# Patient Record
Sex: MALE | Race: WHITE | Employment: OTHER | URBAN - METROPOLITAN AREA
[De-identification: names, ages, dates, MRNs, and addresses within clinical notes are randomized per-mention and may not be internally consistent; named-entity substitution may affect disease eponyms.]

---

## 2017-02-20 ENCOUNTER — TRANSFERRED RECORDS (OUTPATIENT)
Dept: FAMILY MEDICINE | Facility: CLINIC | Age: 72
End: 2017-02-20

## 2017-02-20 ENCOUNTER — OFFICE VISIT (OUTPATIENT)
Dept: FAMILY MEDICINE | Facility: CLINIC | Age: 72
End: 2017-02-20

## 2017-02-20 VITALS
BODY MASS INDEX: 33.27 KG/M2 | DIASTOLIC BLOOD PRESSURE: 78 MMHG | HEART RATE: 70 BPM | RESPIRATION RATE: 16 BRPM | HEIGHT: 69 IN | WEIGHT: 224.6 LBS | OXYGEN SATURATION: 93 % | SYSTOLIC BLOOD PRESSURE: 112 MMHG

## 2017-02-20 DIAGNOSIS — B02.30 HERPES OCULAR: ICD-10-CM

## 2017-02-20 DIAGNOSIS — M25.552 PAIN OF BOTH HIP JOINTS: ICD-10-CM

## 2017-02-20 DIAGNOSIS — M25.551 PAIN OF BOTH HIP JOINTS: ICD-10-CM

## 2017-02-20 DIAGNOSIS — D69.3 IDIOPATHIC THROMBOCYTOPENIA (H): ICD-10-CM

## 2017-02-20 DIAGNOSIS — K21.9 GASTROESOPHAGEAL REFLUX DISEASE, ESOPHAGITIS PRESENCE NOT SPECIFIED: ICD-10-CM

## 2017-02-20 DIAGNOSIS — I25.10 CORONARY ARTERY DISEASE INVOLVING NATIVE CORONARY ARTERY OF NATIVE HEART WITHOUT ANGINA PECTORIS: ICD-10-CM

## 2017-02-20 DIAGNOSIS — E78.5 HYPERLIPIDEMIA LDL GOAL <70: ICD-10-CM

## 2017-02-20 DIAGNOSIS — Z00.00 MEDICARE ANNUAL WELLNESS VISIT, SUBSEQUENT: Primary | ICD-10-CM

## 2017-02-20 DIAGNOSIS — Z13.6 SCREENING FOR AAA (ABDOMINAL AORTIC ANEURYSM): ICD-10-CM

## 2017-02-20 PROBLEM — I25.84 CORONARY ARTERY DISEASE DUE TO CALCIFIED CORONARY LESION: Status: RESOLVED | Noted: 2017-02-20 | Resolved: 2017-02-20

## 2017-02-20 PROBLEM — I25.84 CORONARY ARTERY DISEASE DUE TO CALCIFIED CORONARY LESION: Status: ACTIVE | Noted: 2017-02-20

## 2017-02-20 LAB
% GRANULOCYTES: 68.1 % (ref 42.2–75.2)
HCT VFR BLD AUTO: 45 % (ref 39–51)
HEMOGLOBIN: 14.9 G/DL (ref 13.4–17.5)
LYMPHOCYTES NFR BLD AUTO: 25 % (ref 20.5–51.1)
MCH RBC QN AUTO: 29.8 PG (ref 27–31)
MCHC RBC AUTO-ENTMCNC: 33.2 G/DL (ref 33–37)
MCV RBC AUTO: 89.5 FL (ref 80–100)
MONOCYTES NFR BLD AUTO: 6.9 % (ref 1.7–9.3)
PLATELET # BLD AUTO: 198 K/UL (ref 140–450)
RBC # BLD AUTO: 5.02 X10/CMM (ref 4.2–5.9)
WBC # BLD AUTO: 6.9 X10/CMM (ref 3.8–11)

## 2017-02-20 PROCEDURE — 99215 OFFICE O/P EST HI 40 MIN: CPT | Mod: 25 | Performed by: FAMILY MEDICINE

## 2017-02-20 PROCEDURE — 80061 LIPID PANEL: CPT | Mod: 90 | Performed by: FAMILY MEDICINE

## 2017-02-20 PROCEDURE — 36415 COLL VENOUS BLD VENIPUNCTURE: CPT | Performed by: FAMILY MEDICINE

## 2017-02-20 PROCEDURE — G0439 PPPS, SUBSEQ VISIT: HCPCS | Performed by: FAMILY MEDICINE

## 2017-02-20 PROCEDURE — 85025 COMPLETE CBC W/AUTO DIFF WBC: CPT | Performed by: FAMILY MEDICINE

## 2017-02-20 PROCEDURE — 80053 COMPREHEN METABOLIC PANEL: CPT | Mod: 90 | Performed by: FAMILY MEDICINE

## 2017-02-20 RX ORDER — DIPHENOXYLATE HYDROCHLORIDE AND ATROPINE SULFATE 2.5; .025 MG/1; MG/1
1 TABLET ORAL DAILY
COMMUNITY

## 2017-02-20 RX ORDER — ATORVASTATIN CALCIUM 80 MG/1
80 TABLET, FILM COATED ORAL DAILY
Qty: 90 TABLET | Refills: 3 | Status: SHIPPED | OUTPATIENT
Start: 2017-02-20 | End: 2017-02-24

## 2017-02-20 NOTE — MR AVS SNAPSHOT
After Visit Summary   2/20/2017    Lui Up    MRN: [id number]           Patient Information     Date Of Birth          1945        Visit Information        Provider Department      2/20/2017 9:30 AM Hernan Ross MD [facility]        Today's Diagnoses     Medicare annual wellness visit, subsequent    -  1    Gastroesophageal reflux disease, esophagitis presence not specified        Hyperlipidemia LDL goal <70        Idiopathic thrombocytopenia (H)        Herpes ocular        Coronary artery disease due to calcified coronary lesion        Coronary artery disease involving native coronary artery of native heart without angina pectoris        Pain of both hip joints        Screening for AAA (abdominal aortic aneurysm)          Care Instructions      Preventive Health Recommendations:       Male Ages 65 and over    Yearly exam:             See your health care provider every year in order to  o   Review health changes.   o   Discuss preventive care.    o   Review your medicines if your doctor has prescribed any.    Talk with your health care provider about whether you should have a test to screen for prostate cancer (PSA).    Every 3 years, have a diabetes test (fasting glucose). If you are at risk for diabetes, you should have this test more often.    Every 5 years, have a cholesterol test. Have this test more often if you are at risk for high cholesterol or heart disease.     Every 10 years, have a colonoscopy. Or, have a yearly FIT test (stool test). These exams will check for colon cancer.    Talk to with your health care provider about screening for Abdominal Aortic Aneurysm if you have a family history of AAA or have a history of smoking.  Shots:     Get a flu shot each year.     Get a tetanus shot every 10 years.     Talk to your doctor about your pneumonia vaccines. There are now two you should receive - Pneumovax (PPSV 23) and Prevnar (PCV 13).    Talk to your doctor  about a shingles vaccine.     Talk to your doctor about the hepatitis B vaccine.  Nutrition:     Eat at least 5 servings of fruits and vegetables each day.     Eat whole-grain bread, whole-wheat pasta and brown rice instead of white grains and rice.     Talk to your doctor about Calcium and Vitamin D.   Lifestyle    Exercise for at least 150 minutes a week (30 minutes a day, 5 days a week). This will help you control your weight and prevent disease.     Limit alcohol to one drink per day.     No smoking.     Wear sunscreen to prevent skin cancer.     See your dentist every six months for an exam and cleaning.     See your eye doctor every 1 to 2 years to screen for conditions such as glaucoma, macular degeneration and cataracts.        Follow-ups after your visit        Additional Services     [facility]       Referral to [facility]  Phone: [phone]  Fax: [phone]  Reason for referral: Needs screening of Abd Aorta with US, Also needs Xray of hips for eval of hip pain                  Who to contact     If you have questions or need follow up information about today's clinic visit or your schedule please contact [facility] directly at [phone].  Normal or non-critical lab and imaging results will be communicated to you by StackAdapthart, letter or phone within 4 business days after the clinic has received the results. If you do not hear from us within 7 days, please contact the clinic through StackAdapthart or phone. If you have a critical or abnormal lab result, we will notify you by phone as soon as possible.  Submit refill requests through Veloci[employer] or call your pharmacy and they will forward the refill request to us. Please allow 3 business days for your refill to be completed.          Additional Information About Your Visit        StackAdapthart Information     [employer] gives you secure access to your electronic health record. If you see a primary care provider, you can also send  "messages to your care team and make appointments. If you have questions, please call your primary care clinic.  If you do not have a primary care provider, please call [phone] and they will assist you.        Care EveryWhere ID     This is your Care EveryWhere ID. This could be used by other organizations to access your [facility] medical records  [id number]        Your Vitals Were     Pulse Respirations Height Pulse Oximetry BMI (Body Mass Index)       70 16 1.753 m (5' 9\") 93% 33.17 kg/m2        Blood Pressure from Last 3 Encounters:   02/20/17 112/78   01/27/16 125/81   12/29/14 130/78    Weight from Last 3 Encounters:   02/20/17 101.9 kg (224 lb 9.6 oz)   01/27/16 100.9 kg (222 lb 6.4 oz)   12/29/14 103.9 kg (229 lb)              We Performed the Following     CBC with Diff/Plt ([facility])     Comp. Metabolic Panel (14) ([facility])     Lipid Panel ([facility])     Referral to [facility]          Where to get your medicines      These medications were sent to [place] [address] [place] [address]  [address] [place] [address]     Phone:  [phone]     atorvastatin 80 MG tablet          Primary Care Provider Office Phone # Fax #    Hernan Ross [phone]       [facility] [address] [facility] [address]        Thank you!     Thank you for choosing [facility]  for your care. Our goal is always to provide you with excellent care. Hearing back from our patients is one way we can continue to improve our services. Please take a few minutes to complete the written survey that you may receive in the mail after your visit with us. Thank you!             Your Updated Medication List - Protect others around you: Learn how to safely use, store and throw away your medicines at www.disposemymeds.org.          This list is accurate as of: 2/20/17  9:53 AM.  Always use your most recent med list.                   Brand Name Dispense Instructions " for use    acyclovir 400 MG tablet    ZOVIRAX    180 tablet    Take 1 tablet (400 mg) by mouth 2 times daily       aspirin 81 MG tablet      Take 1 tablet by mouth every morning.       atorvastatin 80 MG tablet    LIPITOR    90 tablet    Take 1 tablet (80 mg) by mouth daily       MULTI-VITAMINS Tabs      Take by mouth daily       PEPCID COMPLETE PO      Take 1 tablet by mouth as needed.

## 2017-02-20 NOTE — PROGRESS NOTES
SUBJECTIVE:                                                            Lui Up is a 72 year old male who presents for Preventive Visit.    Also Multiple medical issues   See notes  Gerd, pepcid ac, 1-2 per week- not feeling any symptoms of consequence  Cad on cardiac calcium - many years ago . On asa and lipitor.   High lipids , on atorvastatin , NO MYALGIAS  Thrombocytopenia- documented on cbcs in past. No bleeding or bruising issues  Hip pain   right > left pain at night  Has been for > year , last year I told him it was bursitis.  Not limitin much, bothers at night  Tries to stretch on yoga ball     Exercise walks- DAILY AND FEELS GOOD    Are you in the first 12 months of your Medicare Part B coverage?  No    Healthy Habits:    Do you get at least three servings of calcium containing foods daily (dairy, green leafy vegetables, etc.)? no, taking calcium and/or vitamin D supplement: daily    Amount of exercise or daily activities, outside of work: 7 day(s) per week  1-3 mi walking per day    Problems taking medications regularly No    Medication side effects: No    Have you had an eye exam in the past two years? yes    Do you see a dentist twice per year? yes    Do you have sleep apnea, excessive snoring or daytime drowsiness?daytime drowsiness    COGNITIVE SCREEN  1) Repeat 3 items (Banana, Sunrise, Chair)    2) Clock draw: NORMAL  3) 3 item recall: Recalls 3 objects  Results: 3 items recalled: COGNITIVE IMPAIRMENT LESS LIKELY    Mini-CogTM Copyright S Rocco. Licensed by the author for use in [facility]; reprinted with permission (alvaro@.[email or web address]). All rights reserved.        Fasting lab, pain in both hips    All Histories reviewed and updated in EPIC as appropriate.    Lives in [facility], wife is ale      Social History   Substance Use Topics     Smoking status: Former Smoker     Types: Cigarettes     Quit date: 7/26/1971     Smokeless tobacco: Never Used      Comment: quit in [facility]      Alcohol use 1.8 - 3.0 oz/week     3 - 5 Cans of beer per week       The patient does not drink >3 drinks per day nor >7 drinks per week.  Past Medical History   Diagnosis Date     Arthritis of knee      CAD (coronary artery disease)      based on calcium on ct     CKD (chronic kidney disease) stage 2, GFR 60-89 ml/min 8/4/2011     Colonic polyp      Diverticula of colon      GERD (gastroesophageal reflux disease)      Hearing loss on left      Herpes simplex, ocular      history of herpes, prophylaxtic per ophtho     Hyperlipidemia LDL goal <70      Thrombocytopenia (H)      Past Surgical History   Procedure Laterality Date     Laminectomy lumbar one level  1991,1998,2000     3 surgeriies      Arthroscopy knee  1995     left     Herniorrhaphy incisional (location)  10/2010     Hemicolectomy, rt/lt  2/2009     right Laprocopic     Current Outpatient Prescriptions   Medication     Multiple Vitamin (MULTI-VITAMINS) TABS     atorvastatin (LIPITOR) 80 MG tablet     acyclovir (ZOVIRAX) 400 MG tablet     Famotidine-Ca Carb-Mag Hydrox (PEPCID COMPLETE PO)     aspirin 81 MG tablet     [DISCONTINUED] atorvastatin (LIPITOR) 80 MG tablet     No current facility-administered medications for this visit.        Today's PHQ-2 Score:   PHQ-2 ( 1999 Pfizer) 2/20/2017 1/27/2016   Q1: Little interest or pleasure in doing things 0 0   Q2: Feeling down, depressed or hopeless 0 0   PHQ-2 Score 0 0       Do you feel safe in your environment - Yes    Do you have a Health Care Directive?: Yes: Advance Directive has been received and scanned.    Current providers sharing in care for this patient include:   Patient Care Team:  Hernan Ross MD as PCP - General (Family Practice)      Hearing impairment: Yes, Difficulty following a conversation in a noisy restaurant or crowded room.    Ability to successfully perform activities of daily living: Yes, no assistance needed     Fall risk:  Fallen 2 or more times in the past year?: No  Any fall  "with injury in the past year?: No    Home safety:  none identified  2  HEARING FREQUENCY TESTED BOTH EARS:Failed  Right Ear/Left Ear      500 Hz: passed/failed: pass    1000 Hz: Passed   2000 Hz: Passed   4000 Hz: failed left , pass right  Brianne Shah, MA 2/20/2017        The following health maintenance items are reviewed in Epic and correct as of today:  Health Maintenance   Topic Date Due     AORTIC ANEURYSM SCREENING (SYSTEM ASSIGNED)  01/26/2009     INFLUENZA VACCINE (SYSTEM ASSIGNED)  09/01/2016     FALL RISK ASSESSMENT  01/27/2017     TETANUS IMMUNIZATION (SYSTEM ASSIGNED)  09/26/2017     COLONOSCOPY Q5 YR INBASKET MESSAGE  01/12/2020     LIPID SCREEN Q5 YR MALE (SYSTEM ASSIGNED)  01/27/2021     ADVANCE DIRECTIVE PLANNING Q5 YRS (NO INBASKET)  03/21/2021     PNEUMOCOCCAL  Completed         ROS:  Constitutional, HEENT, cardiovascular, pulmonary, GI, , neuro, skin, endocrine and psych systems are negative, except as otherwise noted.    positive   pain in right hip at night has to get usp and move around then can return to sleep  relux is good with h2 blk     Problem list, Medication list, Allergies, and Medical/Social/Surgical histories reviewed in [employer] and updated as appropriate.  OBJECTIVE:                                                            /78 (BP Location: Right arm, Patient Position: Chair, Cuff Size: Adult Large)  Pulse 70  Resp 16  Ht 1.753 m (5' 9\")  Wt 101.9 kg (224 lb 9.6 oz)  SpO2 93%  BMI 33.17 kg/m2   Estimated body mass index is 32.37 kg/(m^2) as calculated from the following:    Height as of 1/27/16: 1.765 m (5' 9.5\").    Weight as of 1/27/16: 100.9 kg (222 lb 6.4 oz).  EXAM:   GENERAL: alert, no distress, over weight and Muscular   EYES: Eyes grossly normal to inspection, PERRL and conjunctivae and sclerae normal  HENT: ear canals and TM's normal, nose and mouth without ulcers or lesions  NECK: no adenopathy, no asymmetry, masses, or scars and thyroid normal to palpation  RESP: " lungs clear to auscultation - no rales, rhonchi or wheezes  CV: regular rate and rhythm, normal S1 S2, no S3 or S4, SOFT 2/6 SYS MURMUR click or rub, no peripheral edema and peripheral pulses strong  ABDOMEN: MID LINE SCAR LOOKS GOOD NO HJERNIA  soft, nontender, no hepatosplenomegaly, no masses and bowel sounds normal  RECTAL: normal sphincter tone, no rectal masses, prostate normal size, smooth, nontender without nodules or masses  MS: BACK MIDLINE SCAR right hip has limited flex and rotation int and ext. Vs the left  Not terribly painful for him  SKIN: no suspicious lesions or rashes  NEURO: Normal strength and tone, mentation intact and speech normal  PSYCH: mentation appears normal, affect normal/bright    ASSESSMENT / PLAN:                                                            Lui was seen today for physical and hearing screening.    Diagnoses and all orders for this visit:    Medicare annual wellness visit, subsequent  Exercise and diet seem good  imm UTD  Colon UTD  NO MORE PSA REC    Screening for AAA (abdominal aortic aneurysm)  -     Referral to [facility]  Past history of smoking, and mild cad    Multiple medical issues    Pain of both hip joints  -     Referral to [facility]  Bilateral xray  seemslike restricted motiion. Not enough of a problem to consider surgery but may benefit from PT if no arthritis.    Coronary artery disease involving native coronary artery of native heart without angina pectoris  -     atorvastatin (LIPITOR) 80 MG tablet; Take 1 tablet (80 mg) by mouth daily  -     Lipid Panel ([facility])  No angia     Hyperlipidemia LDL goal <70  -     Comp. Metabolic Panel (14) ([facility])  -     Lipid Panel ([facility])    Idiopathic thrombocytopenia (H)  -     CBC with Diff/Plt ([facility])  No bruising or bleeding     Gastroesophageal reflux disease, esophagitis presence not specified  Continue otc meds prn    Herpes ocular        End of Life Planning:  Patient currently has an  "advanced directive: Yes.  Practitioner is supportive of decision.    COUNSELING:  Reviewed preventive health counseling, as reflected in patient instructions       Consider AAA screening for ages 65-75 and smoking history       Regular exercise       Healthy diet/nutrition       Vision screening       Colon cancer screening        Estimated body mass index is 32.37 kg/(m^2) as calculated from the following:    Height as of 1/27/16: 1.765 m (5' 9.5\").    Weight as of 1/27/16: 100.9 kg (222 lb 6.4 oz).  Weight management plan: Discussed healthy diet and exercise guidelines and patient will follow up in 12 months in clinic to re-evaluate.   reports that he quit smoking about 45 years ago. His smoking use included Cigarettes. He has never used smokeless tobacco.      Appropriate preventive services were discussed with this patient, including applicable screening as appropriate for cardiovascular disease, diabetes, osteopenia/osteoporosis, and glaucoma.  As appropriate for age/gender, discussed screening for colorectal cancer, prostate cancer, breast cancer, and cervical cancer. Checklist reviewing preventive services available has been given to the patient.    Reviewed patients plan of care and provided an AVS. The Basic Care Plan (routine screening as documented in Health Maintenance) for Lui meets the Care Plan requirement. This Care Plan has been established and reviewed with the Patient.    Counseling Resources:  ATP IV Guidelines  Pooled Cohorts Equation Calculator  Breast Cancer Risk Calculator  FRAX Risk Assessment  ICSI Preventive Guidelines  Dietary Guidelines for Americans, 2010  USDA's MyPlate  ASA Prophylaxis  Lung CA Screening    >40 min spent with patient, greater than 50% spent on discussion/education/planning - as outlined in assessment and plan      Hernan Ross MD  [facility]  "

## 2017-02-20 NOTE — LETTER
[facility] [address] [place]  [address]  Phone: [phone]    February 21, 2017      Lui Up                                                                                                                                 [address] [place] [address]            Dear Lui,      The Xray shows mild degenerative arthritis is hips. Looks the same in each side.   If the pain is a bigger issue let me know.      If you have any questions or concerns, please call the clinic at [phone] and make a follow up appointment with me.      Sincerely,    Hernan Moffett M.D.

## 2017-02-21 LAB
ALBUMIN SERPL-MCNC: 4.2 G/DL (ref 3.5–4.8)
ALBUMIN/GLOB SERPL: 2 {RATIO} (ref 1.1–2.5)
ALP SERPL-CCNC: 89 IU/L (ref 39–117)
ALT SERPL-CCNC: 17 IU/L (ref 0–44)
AST SERPL-CCNC: 18 IU/L (ref 0–40)
BILIRUB SERPL-MCNC: 1.3 MG/DL (ref 0–1.2)
BUN SERPL-MCNC: 14 MG/DL (ref 8–27)
BUN/CREATININE RATIO: 13 (ref 10–22)
CALCIUM SERPL-MCNC: 9.3 MG/DL (ref 8.6–10.2)
CHLORIDE SERPLBLD-SCNC: 101 MMOL/L (ref 96–106)
CHOLEST SERPL-MCNC: 169 MG/DL (ref 100–199)
CREAT SERPL-MCNC: 1.07 MG/DL (ref 0.76–1.27)
EGFR IF AFRICN AM: 79 ML/MIN/1.73
EGFR IF NONAFRICN AM: 68 ML/MIN/1.73
GLOBULIN, TOTAL: 2.1 G/DL (ref 1.5–4.5)
GLUCOSE SERPL-MCNC: 90 MG/DL (ref 65–99)
HDLC SERPL-MCNC: 68 MG/DL
LDL/HDL RATIO: 1.3 RATIO UNITS (ref 0–3.6)
LDLC SERPL CALC-MCNC: 87 MG/DL (ref 0–99)
POTASSIUM SERPL-SCNC: 4.4 MMOL/L (ref 3.5–5.2)
PROT SERPL-MCNC: 6.3 G/DL (ref 6–8.5)
SODIUM SERPL-SCNC: 140 MMOL/L (ref 134–144)
TOTAL CO2: 24 MMOL/L (ref 18–28)
TRIGL SERPL-MCNC: 69 MG/DL (ref 0–149)
VLDLC SERPL CALC-MCNC: 14 MG/DL (ref 5–40)

## 2017-02-21 NOTE — PROGRESS NOTES
Antoine the Xray shows mild degenerative arthritis is hips. Looks the same in each side. If the pain is a bigger issue let me know. Wally Moffett MD

## 2017-05-03 ENCOUNTER — TRANSFERRED RECORDS (OUTPATIENT)
Dept: FAMILY MEDICINE | Facility: CLINIC | Age: 72
End: 2017-05-03

## 2017-05-10 ENCOUNTER — TRANSFERRED RECORDS (OUTPATIENT)
Dept: FAMILY MEDICINE | Facility: CLINIC | Age: 72
End: 2017-05-10

## 2017-05-24 ENCOUNTER — TRANSFERRED RECORDS (OUTPATIENT)
Dept: FAMILY MEDICINE | Facility: CLINIC | Age: 72
End: 2017-05-24

## 2017-06-07 ENCOUNTER — TRANSFERRED RECORDS (OUTPATIENT)
Dept: FAMILY MEDICINE | Facility: CLINIC | Age: 72
End: 2017-06-07

## 2017-06-16 ENCOUNTER — TRANSFERRED RECORDS (OUTPATIENT)
Dept: FAMILY MEDICINE | Facility: CLINIC | Age: 72
End: 2017-06-16

## 2018-01-09 ENCOUNTER — TRANSFERRED RECORDS (OUTPATIENT)
Dept: FAMILY MEDICINE | Facility: CLINIC | Age: 73
End: 2018-01-09

## 2018-01-09 ENCOUNTER — OFFICE VISIT (OUTPATIENT)
Dept: FAMILY MEDICINE | Facility: CLINIC | Age: 73
End: 2018-01-09

## 2018-01-09 VITALS
OXYGEN SATURATION: 95 % | DIASTOLIC BLOOD PRESSURE: 82 MMHG | WEIGHT: 230 LBS | BODY MASS INDEX: 33.97 KG/M2 | HEART RATE: 68 BPM | SYSTOLIC BLOOD PRESSURE: 132 MMHG | TEMPERATURE: 97.8 F

## 2018-01-09 DIAGNOSIS — N50.89 MASS OF RIGHT TESTICLE: Primary | ICD-10-CM

## 2018-01-09 PROCEDURE — 99214 OFFICE O/P EST MOD 30 MIN: CPT | Performed by: FAMILY MEDICINE

## 2018-01-09 NOTE — MR AVS SNAPSHOT
After Visit Summary   1/9/2018    Lui Up    MRN: [id number]           Patient Information     Date Of Birth          1945        Visit Information        Provider Department      1/9/2018 2:30 PM Kayla Cordova MD [facility]        Care Instructions    Schedule Testicular US    Td get that at the pharmacy    Get new shingles vaccine when available          Follow-ups after your visit        Who to contact     If you have questions or need follow up information about today's clinic visit or your schedule please contact [facility] directly at [phone].  Normal or non-critical lab and imaging results will be communicated to you by Stroodlehart, letter or phone within 4 business days after the clinic has received the results. If you do not hear from us within 7 days, please contact the clinic through 6Wunderkindert or phone. If you have a critical or abnormal lab result, we will notify you by phone as soon as possible.  Submit refill requests through [employer] or call your pharmacy and they will forward the refill request to us. Please allow 3 business days for your refill to be completed.          Additional Information About Your Visit        MyChart Information     [employer] gives you secure access to your electronic health record. If you see a primary care provider, you can also send messages to your care team and make appointments. If you have questions, please call your primary care clinic.  If you do not have a primary care provider, please call [phone] and they will assist you.        Care EveryWhere ID     This is your Care EveryWhere ID. This could be used by other organizations to access your [place] medical records  [id number]        Your Vitals Were     Pulse Temperature Pulse Oximetry BMI (Body Mass Index)          68 97.8  F (36.6  C) (Oral) 95% 33.97 kg/m2         Blood Pressure from Last 3 Encounters:   01/09/18 132/82   02/20/17 112/78   01/27/16  125/81    Weight from Last 3 Encounters:   01/09/18 104.3 kg (230 lb)   02/20/17 101.9 kg (224 lb 9.6 oz)   01/27/16 100.9 kg (222 lb 6.4 oz)              Today, you had the following     No orders found for display       Primary Care Provider Office Phone # Fax #    Hernan Ross [phone] [address]  [facility]note 3[facility] [address]        Equal Access to Services     KEAGAN GERARDO : Hadii aad ku hadasho Soomaali, waaxda luqadaha, qaybta kaalmada adeegyada, waxay idiin hayaan adeeg kharash laabi . So [facility] [phone].    ATENCIÓN: Si habla español, tiene a bull disposición servicios gratuitos de asistencia lingüística. Llame al [phone].    We comply with applicable federal civil rights laws and [place] laws. We do not discriminate on the basis of race, color, national origin, age, disability, sex, sexual orientation, or gender identity.            Thank you!     Thank you for choosing [facility]  for your care. Our goal is always to provide you with excellent care. Hearing back from our patients is one way we can continue to improve our services. Please take a few minutes to complete the written survey that you may receive in the mail after your visit with us. Thank you!             Your Updated Medication List - Protect others around you: Learn how to safely use, store and throw away your medicines at www.disposemymeds.org.          This list is accurate as of: 1/9/18  2:50 PM.  Always use your most recent med list.                   Brand Name Dispense Instructions for use Diagnosis    acyclovir 400 MG tablet    ZOVIRAX    180 tablet    Take 1 tablet (400 mg) by mouth 2 times daily    Ocular herpes       aspirin 81 MG tablet      Take 1 tablet by mouth every morning.        atorvastatin 80 MG tablet    LIPITOR    90 tablet    TAKE ONE TABLET BY MOUTH ONE TIME DAILY    Coronary artery disease involving native coronary artery of native heart without angina pectoris        MULTI-VITAMINS Tabs      Take by mouth daily        PEPCID COMPLETE PO      Take 1 tablet by mouth as needed.

## 2018-01-09 NOTE — PROGRESS NOTES
HCM:  AAA US no discussed today  Flu vaccine- 8/22/2017 [facility]  Zoster Vaccine- 8/22/2017 [facility]   Pneumovax 23 vaccine- 8/22/2017 [facility]   Tetanus Vaccine - would like, not sure which one to give.     Cc: Lump on right testicle  Last CPX on 2/20/2017     SUBJECTIVE:   Lui Up is a 72 year old male who presents to clinic today for the following health issues:  No urinary symptoms present.     White male with glasses balding  Here with female    Lump on Right Testicle     Never checked before. Saw something on media about checking.    Feels like harder mass  NO fever  NO hernia history   No STD concerns      Duration: Patient noticed a couple of weeks ago.     Description (location/character/radiation): Right testicle, dull pain - intermittently, no numbness, no known recent trauma. Previous trauma in the left testicle as a child.     Intensity:  Not applicate     Accompanying signs and symptoms: see above, no other symptoms     History (similar episodes/previous evaluation): None    Precipitating or alleviating factors: None    Therapies tried and outcome: None         Sleep 4-5 hours, nocturia x1  Appetite ok  Exercise walking    Smoking no, in college then quit  ETOH beer 2 per day  Street drugs/MJ no  Caffeine yes      Problem list and histories reviewed & adjusted, as indicated.  Additional history: as documented    Patient Active Problem List   Diagnosis     Idiopathic thrombocytopenia (H)     Hyperlipidemia LDL goal <70     GERD (gastroesophageal reflux disease)     Advance Care Planning      Herpes ocular     Coronary artery disease involving native coronary artery of native heart without angina pectoris     Past Surgical History:   Procedure Laterality Date     ARTHROSCOPY KNEE  1995    left     HEMICOLECTOMY, RT/LT  2/2009    right Laprocopic     HERNIORRHAPHY INCISIONAL (LOCATION)  10/2010     LAMINECTOMY LUMBAR ONE LEVEL  1991,1998,2000    3 surgeriies        Social History   Substance  "Use Topics     Smoking status: Former Smoker     Types: Cigarettes     Quit date: 7/26/1971     Smokeless tobacco: Never Used      Comment: quit in [facility]     Alcohol use 3.0 - 4.2 oz/week     5 - 7 Cans of beer per week     Family History   Problem Relation Age of Onset     CANCER Mother      gallbadder     Neurologic Disorder Father      parkinson     Alzheimer Disease Father          Current Outpatient Prescriptions   Medication Sig Dispense Refill     atorvastatin (LIPITOR) 80 MG tablet TAKE ONE TABLET BY MOUTH ONE TIME DAILY 90 tablet 3     Multiple Vitamin (MULTI-VITAMINS) TABS Take by mouth daily       acyclovir (ZOVIRAX) 400 MG tablet Take 1 tablet (400 mg) by mouth 2 times daily 180 tablet 3     Famotidine-Ca Carb-Mag Hydrox (PEPCID COMPLETE PO) Take 1 tablet by mouth as needed.       aspirin 81 MG tablet Take 1 tablet by mouth every morning.       No Known Allergies  Recent Labs   Lab Test  02/20/17   1002  01/27/16   0845  12/22/14   0820   11/22/10   1715  11/20/10   0530   LDL  87  84  93   < >   --    --    HDL  68  67  61   < >   --    --    TRIG  69  50  60   < >   --    --    ALT  17  17  16   < >   --   15   CR  1.07  1.11  1.13   < >  1.11  1.35*   GFRESTIMATED   --    --    --    --   66  53*   GFRESTBLACK   --    --    --    --   80  64   POTASSIUM  4.4  5.1  4.5   < >  4.4  4.0    < > = values in this interval not displayed.      BP Readings from Last 3 Encounters:   01/09/18 132/82   02/20/17 112/78   01/27/16 125/81    Wt Readings from Last 3 Encounters:   01/09/18 104.3 kg (230 lb)   02/20/17 101.9 kg (224 lb 9.6 oz)   01/27/16 100.9 kg (222 lb 6.4 oz)       Estimated body mass index is 33.97 kg/(m^2) as calculated from the following:    Height as of 2/20/17: 1.753 m (5' 9\").    Weight as of this encounter: 104.3 kg (230 lb).             Labs reviewed in EPIC          Reviewed and updated as needed this visit by clinical staff     Reviewed and updated as needed this visit by Provider     "     ROS:  Constitutional, HEENT, cardiovascular, pulmonary, GI, , musculoskeletal, neuro, skin, endocrine and psych systems are negative, except as otherwise noted.  Check skin on face    STD no concerns    Ex-    OBJECTIVE:   /82  Pulse 68  Temp 97.8  F (36.6  C) (Oral)  Wt 104.3 kg (230 lb)  SpO2 95%  BMI 33.97 kg/m2  There is no height or weight on file to calculate BMI.  GENERAL: healthy, alert and no distress glasses and bilateral hearing aids  EYES: Eyes grossly normal to inspection, PERRL and conjunctivae and sclerae normal  HENT: ear canals and TM's normal, nose and mouth without ulcers or lesions  NECK: no adenopathy, no asymmetry, masses, or scars and thyroid normal to palpation  RESP: lungs clear to auscultation - no rales, rhonchi or wheezes  CV: regular rate and rhythm, normal S1 S2, no S3 or S4, no murmur, click or rub, no peripheral edema and peripheral pulses strong  ABDOMEN: soft, nontender, no hepatosplenomegaly, no masses and bowel sounds normal   (male): normal male genitalia without lesions or urethral discharge, no hernia  Likely varicocele. None tender Vesicles, no epididymitis. No maryjane hard mass. S/p vasectomy  MS: no gross musculoskeletal defects noted, no edema  SKIN: no suspicious lesions or rashes  NEURO: Normal strength and tone, mentation intact and speech normal  PSYCH: mentation appears normal, affect normal/bright  LYMPH: no cervical, supraclavicular, axillary, or inguinal adenopathy    Diagnostic Test Results:  Results for orders placed or performed in visit on 02/20/17   Comp. Metabolic Panel (14) ([facility])   Result Value Ref Range    Glucose 90 65 - 99 mg/dL    Urea Nitrogen 14 8 - 27 mg/dL    Creatinine 1.07 0.76 - 1.27 mg/dL    eGFR If NonAfricn Am 68 >59 mL/min/1.73    eGFR If Africn Am 79 >59 mL/min/1.73    BUN/Creatinine Ratio 13 10 - 22    Sodium 140 134 - 144 mmol/L    Potassium 4.4 3.5 - 5.2 mmol/L    Chloride 101 96 - 106 mmol/L    Total CO2  24 18 - 28 mmol/L    Calcium 9.3 8.6 - 10.2 mg/dL    Protein Total 6.3 6.0 - 8.5 g/dL    Albumin 4.2 3.5 - 4.8 g/dL    Globulin, Total 2.1 1.5 - 4.5 g/dL    A/G Ratio 2.0 1.1 - 2.5    Bilirubin Total 1.3 (H) 0.0 - 1.2 mg/dL    Alkaline Phosphatase 89 39 - 117 IU/L    AST 18 0 - 40 IU/L    ALT 17 0 - 44 IU/L    Narrative    Performed at:  01 - [facility] [address], [place], [place]  [id number]  : Johan Mckeon MD, Phone:  [phone]   CBC with Diff/Plt ([facility])   Result Value Ref Range    WBC x10/cmm 6.9 3.8 - 11.0 x10/cmm    % Lymphocytes 25.0 20.5 - 51.1 %    % Monocytes 6.9 1.7 - 9.3 %    % Granulocytes 68.1 42.2 - 75.2 %    RBC x10/cmm 5.02 4.2 - 5.9 x10/cmm    Hemoglobin 14.9 13.4 - 17.5 g/dl    Hematocrit 45.0 39 - 51 %    MCV 89.5 80 - 100 fL    MCH 29.8 27.0 - 31.0 pg    MCHC 33.2 33.0 - 37.0 g/dL    Platelet Count 198 140 - 450 K/uL   Lipid Panel ([facility])   Result Value Ref Range    Cholesterol 169 100 - 199 mg/dL    Triglycerides 69 0 - 149 mg/dL    HDL Cholesterol 68 >39 mg/dL    VLDL Cholesterol Sulaiman 14 5 - 40 mg/dL    LDL Cholesterol Calculated 87 0 - 99 mg/dL    LDL/HDL Ratio 1.3 0.0 - 3.6 ratio units    Narrative    Performed at:  01 - [facility] [address], [place], [place]  [id number]  : Johan Mckeon MD, Phone:  [phone]       ASSESSMENT/PLAN:     ASSESSMENT / PLAN:  (N50.9) Mass of right testicle  (primary encounter diagnosis)  Comment: likely varicocele  Doubt epidydimitis or cancer  Sent for US   Plan: US, observation            Patient Instructions   Schedule Testicular US (likely varicocele)    Td get that at the pharmacy    Get new shingles vaccine when available      Kayla Cordova MD  [facility]

## 2018-01-09 NOTE — PATIENT INSTRUCTIONS
Schedule Testicular US (likely varicocele)    Td get that at the pharmacy    Get new shingles vaccine when available

## 2018-01-11 ENCOUNTER — TELEPHONE (OUTPATIENT)
Dept: FAMILY MEDICINE | Facility: CLINIC | Age: 73
End: 2018-01-11

## 2018-01-12 NOTE — TELEPHONE ENCOUNTER
----- Message from Kayla Cordova MD sent at 1/11/2018  7:06 AM CST -----  Please call with results, which are within the range we expected. The patient may continue his current plan of care.  Testicular US results  RN please review these with the patient. No action needed.     Kayla Cordova MD

## 2018-01-12 NOTE — TELEPHONE ENCOUNTER
Patient called with results of testicular U/S.  Notified of normal results, small hydrocele but no masses.  advised patient to call clinic with questions or problems.  China Verma

## 2018-02-12 DIAGNOSIS — I25.10 CORONARY ARTERY DISEASE INVOLVING NATIVE CORONARY ARTERY OF NATIVE HEART WITHOUT ANGINA PECTORIS: ICD-10-CM

## 2018-02-12 RX ORDER — ATORVASTATIN CALCIUM 80 MG/1
80 TABLET, FILM COATED ORAL DAILY
Qty: 90 TABLET | Refills: 3 | Status: SHIPPED | OUTPATIENT
Start: 2018-02-12 | End: 2019-02-21

## 2018-08-17 NOTE — PATIENT INSTRUCTIONS
Preventive Health Recommendations:       Male Ages 65 and over    Yearly exam:             See your health care provider every year in order to  o   Review health changes.   o   Discuss preventive care.    o   Review your medicines if your doctor has prescribed any.    Talk with your health care provider about whether you should have a test to screen for prostate cancer (PSA).    Every 3 years, have a diabetes test (fasting glucose). If you are at risk for diabetes, you should have this test more often.    Every 5 years, have a cholesterol test. Have this test more often if you are at risk for high cholesterol or heart disease.     Every 10 years, have a colonoscopy. Or, have a yearly FIT test (stool test). These exams will check for colon cancer.    Talk to with your health care provider about screening for Abdominal Aortic Aneurysm if you have a family history of AAA or have a history of smoking.  Shots:     Get a flu shot each year.     Get a tetanus shot every 10 years.     Talk to your doctor about your pneumonia vaccines. There are now two you should receive - Pneumovax (PPSV 23) and Prevnar (PCV 13).    Talk to your pharmacist about a shingles vaccine.     Talk to your doctor about the hepatitis B vaccine.  Nutrition:     Eat at least 5 servings of fruits and vegetables each day.     Eat whole-grain bread, whole-wheat pasta and brown rice instead of white grains and rice.     Get adequate Calcium and Vitamin D.   Lifestyle    Exercise for at least 150 minutes a week (30 minutes a day, 5 days a week). This will help you control your weight and prevent disease.     Limit alcohol to one drink per day.     No smoking.     Wear sunscreen to prevent skin cancer.     See your dentist every six months for an exam and cleaning.     See your eye doctor every 1 to 2 years to screen for conditions such as glaucoma, macular degeneration and cataracts.    TD and shingrix  get this at the pharmacy (call insurance to check  coverage on Shingrix vaccine)    PT for hip    Trial meloxicam with food once daily. Tylenol is ok. Not additional ASA, Ibuprofen or alleve with this.  Heat or ice which ever feels better  Salon Pas lidocaine 4% otc for pain    Continue diet and exercise as you are able    F/u one month for recheck hip    Labs today

## 2018-08-17 NOTE — PROGRESS NOTES
SUBJECTIVE:   Lui Up is a 73 year old male who presents for Preventive Visit.    White male glasses    [employer] in HS  Retired  Formerly [facility], [place], [place], and substituted after that.  Hobbies   Dog  Taking care of parents  Has RV. [place] rib fest. 3 days of diarrhea- better now. Music festival.  Concerns with right hip, pain almost daily, feels like it is pinched.   On and off one year. Today not an issue.  Hard to sit. Feels like pinching.  Walking dog 2 times per day or more. No issues.  Getting up.  Stairs both up and down hurts   Tried yoga ball, stretch.  Ibuprofen prn  Enu pain relief OTC  Just got new walking shoes  Runner in the past    Sleep 5-6 hours, nocturia sometimes  Slower stream.   Appetite ok  Breakfast 2 c coffee roll, coffee and PB toast  Lunch PB sandwich, water or beer.  Supper last night maccoronia hot dish with veggies Wife cooking  Snack protein bar, chips, chocolate  Fruits banana, pear  Veggies peas, mixed veggies  Exercise walking    Fall on water when walking the dog over one month ago buttocks no insury  Seizure no  LOC no    Smoking in college, none recent  ETOH beer one a day or less. Never 2 per day  Street drugs/MJ no  Caffeine yes  Depression no  Anxiety no  Panic no  SI/SP no  Hallucinations no  Paranoid no  Manic no  OCD no  PTSD no  Gambling no  Guns yes locked one is, one is not. Ammo is not . Advised to lock the gun and move the ammo.  Sitting 7-8/10 when hip flared. Several times a day sometimes. 45-60 min at a time  Standing sometimes 4-5/10  Walking helps  Lying 7-8/10  Stairs only if pinched before I get there. Worse if already started.    Are you in the first 12 months of your Medicare Part B coverage?  No    Healthy Habits:    Do you get at least three servings of calcium containing foods daily (dairy, green leafy vegetables, etc.)? yes and no, taking calcium and/or vitamin D supplement: no    Amount of exercise or  daily activities, outside of work: 7 day(s) per week    Problems taking medications regularly No    Medication side effects: No    Have you had an eye exam in the past two years? yes    Do you see a dentist twice per year? yes    Do you have sleep apnea, excessive snoring or daytime drowsiness?no      Ability to successfully perform activities of daily living: Yes, no assistance needed    Home safety:  none identified     Hearing impairment: Yes, wear hearing aid.    Fall risk:  Fallen 2 or more times in the past year?: No  Any fall with injury in the past year?: No        COGNITIVE SCREEN  1) Repeat 3 items (Leader, Season, Table)    2) Clock draw: NORMAL  3) 3 item recall: Recalls 3 objects  Results: 3 items recalled: COGNITIVE IMPAIRMENT LESS LIKELY    Mini-CogTM Copyright S Rocco. Licensed by the author for use in [facility]; reprinted with permission ([email or web address]@[email or web address]). All rights reserved.                Reviewed and updated as needed this visit by clinical staff         Reviewed and updated as needed this visit by Provider        Social History   Substance Use Topics     Smoking status: Former Smoker     Types: Cigarettes     Quit date: 7/26/1971     Smokeless tobacco: Never Used      Comment: quit in [facility]     Alcohol use 3.0 - 4.2 oz/week     5 - 7 Cans of beer per week       If you drink alcohol do you typically have >3 drinks per day or >7 drinks per week? No                        Today's PHQ-2 Score:   PHQ-2 ( 1999 Pfizer) 2/20/2017 1/27/2016   Q1: Little interest or pleasure in doing things 0 0   Q2: Feeling down, depressed or hopeless 0 0   PHQ-2 Score 0 0       Do you feel safe in your environment -yes    Do you have a Health Care Directive?: Yes: Advance Directive has been received and scanned.    Current providers sharing in care for this patient include:   Patient Care Team:  Kayla Cordova MD as PCP - General (Family Practice)    The following health maintenance items are reviewed  "in Epic and correct as of today:  Health Maintenance   Topic Date Due     AORTIC ANEURYSM SCREENING (SYSTEM ASSIGNED)  01/26/2009     TETANUS IMMUNIZATION (SYSTEM ASSIGNED)  09/26/2017     FALL RISK ASSESSMENT  02/20/2018     PHQ-2 Q1 YR  02/20/2018     INFLUENZA VACCINE (1) 09/01/2018     COLONOSCOPY Q5 YR  01/12/2020     ADVANCE DIRECTIVE PLANNING Q5 YRS  03/21/2021     LIPID SCREEN Q5 YR MALE (SYSTEM ASSIGNED)  02/20/2022     PNEUMOCOCCAL  Completed     Labs reviewed in EPIC  BP Readings from Last 3 Encounters:   08/22/18 110/78   01/09/18 132/82   02/20/17 112/78    Wt Readings from Last 3 Encounters:   08/22/18 101.2 kg (223 lb)   01/09/18 104.3 kg (230 lb)   02/20/17 101.9 kg (224 lb 9.6 oz)       Estimated body mass index is 32.93 kg/(m^2) as calculated from the following:    Height as of this encounter: 1.753 m (5' 9\").    Weight as of this encounter: 101.2 kg (223 lb).  Weight loss is recommended           Patient Active Problem List   Diagnosis     Idiopathic thrombocytopenia (H)     Hyperlipidemia LDL goal <70     GERD (gastroesophageal reflux disease)     Advance Care Planning      Herpes ocular     Coronary artery disease involving native coronary artery of native heart without angina pectoris     Past Surgical History:   Procedure Laterality Date     ARTHROSCOPY KNEE  1995    left     HEMICOLECTOMY, RT/LT  2/2009    right Laprocopic     HERNIORRHAPHY INCISIONAL (LOCATION)  10/2010     LAMINECTOMY LUMBAR ONE LEVEL  1991,1998,2000    3 surgeriies        Social History   Substance Use Topics     Smoking status: Former Smoker     Types: Cigarettes     Quit date: 7/26/1971     Smokeless tobacco: Never Used      Comment: quit in colleg     Alcohol use 3.0 - 4.2 oz/week     5 - 7 Cans of beer per week     Family History   Problem Relation Age of Onset     Cancer Mother      gallbadder     Neurologic Disorder Father      parkinson     Alzheimer Disease Father          Current Outpatient Prescriptions " "  Medication Sig Dispense Refill     acyclovir (ZOVIRAX) 400 MG tablet Take 1 tablet (400 mg) by mouth 2 times daily 180 tablet 3     aspirin 81 MG tablet Take 1 tablet by mouth every morning.       atorvastatin (LIPITOR) 80 MG tablet Take 1 tablet (80 mg) by mouth daily 90 tablet 3     Famotidine-Ca Carb-Mag Hydrox (PEPCID COMPLETE PO) Take 1 tablet by mouth as needed.       Multiple Vitamin (MULTI-VITAMINS) TABS Take by mouth daily       No Known Allergies  Recent Labs   Lab Test  02/20/17   1002  01/27/16   0845  12/22/14   0820   11/22/10   1715  11/20/10   0530   LDL  87  84  93   < >   --    --    HDL  68  67  61   < >   --    --    TRIG  69  50  60   < >   --    --    ALT  17  17  16   < >   --   15   CR  1.07  1.11  1.13   < >  1.11  1.35*   GFRESTIMATED   --    --    --    --   66  53*   GFRESTBLACK   --    --    --    --   80  64   POTASSIUM  4.4  5.1  4.5   < >  4.4  4.0    < > = values in this interval not displayed.            ROS:  Constitutional, HEENT, cardiovascular, pulmonary, GI, , musculoskeletal, neuro, skin, endocrine and psych systems are negative, except as otherwise noted.    OBJECTIVE:   /78  Pulse 76  Resp 16  Ht 1.753 m (5' 9\")  Wt 101.2 kg (223 lb)  SpO2 99%  BMI 32.93 kg/m2 Estimated body mass index is 33.97 kg/(m^2) as calculated from the following:    Height as of 2/20/17: 1.753 m (5' 9\").    Weight as of 1/9/18: 104.3 kg (230 lb).  EXAM:   GENERAL: healthy, alert and no distress white male glasses bilateral hearing aids  EYES: Eyes grossly normal to inspection, PERRL and conjunctivae and sclerae normal  HENT: ear canals and TM's normal, nose and mouth without ulcers or lesions  NECK: no adenopathy, no asymmetry, masses, or scars and thyroid normal to palpation  RESP: lungs clear to auscultation - no rales, rhonchi or wheezes  CV: regular rate and rhythm, normal S1 S2, no S3 or S4, no murmur, click or rub, no peripheral edema and peripheral pulses strong  ABDOMEN: soft, " nontender, no hepatosplenomegaly, no masses and bowel sounds normal   (male): normal male genitalia without lesions or urethral discharge, no hernia  RECTAL: normal sphincter tone, no rectal masses, prostate normal size, smooth, nontender without nodules or masses  MS: no gross musculoskeletal defects noted, no edema  SKIN: no suspicious lesions or rashes early seborrheic keratosis on scalp-watch  NEURO: Normal strength and tone, mentation intact and speech normal  PSYCH: mentation appears normal, affect normal/bright  LYMPH: no cervical, supraclavicular, axillary, or inguinal adenopathy    ROM of hips tight  Bursa non tender  Tender right front top of the hip bone.    Consider PT    Discussed vaccined get TD and shingrix at pharmacy  Discussed PSA- deferred    Wt Readings from Last 4 Encounters:   08/22/18 101.2 kg (223 lb)   01/09/18 104.3 kg (230 lb)   02/20/17 101.9 kg (224 lb 9.6 oz)   01/27/16 100.9 kg (222 lb 6.4 oz)   more active    Volunteers for [employer] in [place].           New copy of advance care directive brought in today.      Diagnostic Test Results:  Results for orders placed or performed in visit on 02/20/17   Comp. Metabolic Panel (14) ([facility])   Result Value Ref Range    Glucose 90 65 - 99 mg/dL    Urea Nitrogen 14 8 - 27 mg/dL    Creatinine 1.07 0.76 - 1.27 mg/dL    eGFR If NonAfricn Am 68 >59 mL/min/1.73    eGFR If Africn Am 79 >59 mL/min/1.73    BUN/Creatinine Ratio 13 10 - 22    Sodium 140 134 - 144 mmol/L    Potassium 4.4 3.5 - 5.2 mmol/L    Chloride 101 96 - 106 mmol/L    Total CO2 24 18 - 28 mmol/L    Calcium 9.3 8.6 - 10.2 mg/dL    Protein Total 6.3 6.0 - 8.5 g/dL    Albumin 4.2 3.5 - 4.8 g/dL    Globulin, Total 2.1 1.5 - 4.5 g/dL    A/G Ratio 2.0 1.1 - 2.5    Bilirubin Total 1.3 (H) 0.0 - 1.2 mg/dL    Alkaline Phosphatase 89 39 - 117 IU/L    AST 18 0 - 40 IU/L    ALT 17 0 - 44 IU/L    Narrative    Performed at:  01 - [facility] [address], [place], [place]   [id number]  : Johan Mckeon MD, Phone:  [phone]   CBC with Diff/Plt ([facility])   Result Value Ref Range    WBC x10/cmm 6.9 3.8 - 11.0 x10/cmm    % Lymphocytes 25.0 20.5 - 51.1 %    % Monocytes 6.9 1.7 - 9.3 %    % Granulocytes 68.1 42.2 - 75.2 %    RBC x10/cmm 5.02 4.2 - 5.9 x10/cmm    Hemoglobin 14.9 13.4 - 17.5 g/dl    Hematocrit 45.0 39 - 51 %    MCV 89.5 80 - 100 fL    MCH 29.8 27.0 - 31.0 pg    MCHC 33.2 33.0 - 37.0 g/dL    Platelet Count 198 140 - 450 K/uL   Lipid Panel ([facility])   Result Value Ref Range    Cholesterol 169 100 - 199 mg/dL    Triglycerides 69 0 - 149 mg/dL    HDL Cholesterol 68 >39 mg/dL    VLDL Cholesterol Sulaiman 14 5 - 40 mg/dL    LDL Cholesterol Calculated 87 0 - 99 mg/dL    LDL/HDL Ratio 1.3 0.0 - 3.6 ratio units    Narrative    Performed at:  01 - [facility] [address], [place], [place]  [id number]  : Johan Mckeon MD, Phone:  [phone]       ASSESSMENT / PLAN:       ICD-10-CM    1. Medicare annual wellness visit, subsequent Z00.00    2. Need for tetanus booster Z23    3. BMI 32.0-32.9,adult Z68.32    4. Ocular herpes B02.30    5. Coronary artery disease involving native coronary artery of native heart without angina pectoris I25.10 Basic Metabolic Panel (8) ([facility])   6. Idiopathic thrombocytopenia (H) D69.3 CBC with Diff/Plt ([facility])   7. Hyperlipidemia LDL goal <70 E78.5 Lipid Panel ([facility])     Hepatic Function Panel (7) ([facility])   8. Gastroesophageal reflux disease without esophagitis K21.9 CBC with Diff/Plt ([facility])   9. Encounter for therapeutic drug monitoring Z51.81    10. Hip pain, right M25.551 meloxicam (MOBIC) 7.5 MG tablet   11. Former smoker Z87.891        End of Life Planning:  Patient currently has an advanced directive: No.  I have verified the patient's ablity to prepare an advanced directive/make health care decisions.  Literature was provided to assist patient in preparing an advanced directive.    COUNSELING:  Reviewed preventive health  "counseling, as reflected in patient instructions       Regular exercise       Healthy diet/nutrition       Vision screening       Immunizations Reviewed and discussed         Prostate cancer screening deferred    BP Readings from Last 1 Encounters:   08/22/18 110/78     Estimated body mass index is 32.93 kg/(m^2) as calculated from the following:    Height as of this encounter: 1.753 m (5' 9\").    Weight as of this encounter: 101.2 kg (223 lb).      Weight management plan: Discussed healthy diet and exercise guidelines and patient will follow up in 12 months in clinic to re-evaluate.     reports that he quit smoking about 47 years ago. His smoking use included Cigarettes. He has never used smokeless tobacco.      Appropriate preventive services were discussed with this patient, including applicable screening as appropriate for cardiovascular disease, diabetes, osteopenia/osteoporosis, and glaucoma.  As appropriate for age/gender, discussed screening for colorectal cancer, prostate cancer, breast cancer, and cervical cancer. Checklist reviewing preventive services available has been given to the patient.    Reviewed patients plan of care and provided an AVS. The Basic Care Plan (routine screening as documented in Health Maintenance) for Lui meets the Care Plan requirement. This Care Plan has been established and reviewed with the Patient.    Counseling Resources:  ATP IV Guidelines  Pooled Cohorts Equation Calculator  Breast Cancer Risk Calculator  FRAX Risk Assessment  ICSI Preventive Guidelines  Dietary Guidelines for Americans, 2010  USDA's MyPlate  ASA Prophylaxis  Lung CA Screening    ASSESSMENT / PLAN:  (Z00.00) Medicare annual wellness visit, subsequent  (primary encounter diagnosis)  Comment:   Plan: f/u one year    (Z23) Need for tetanus booster  Comment:   Plan: get at pharmacy    (Z68.32) BMI 32.0-32.9,adult  Comment: weight loss is recommended  Plan: diet and exercise discussed    (B02.30) Ocular " herpes  Comment:   Plan: f/u per eye provider. Refill done.     (I25.10) Coronary artery disease involving native coronary artery of native heart without angina pectoris  Comment: no CP today  Plan: Basic Metabolic Panel (8) ([facility])        Per cardiology    (D69.3) Idiopathic thrombocytopenia (H)  Comment: no bleeding/bruising reported  Plan: CBC with Diff/Plt ([facility])            (E78.5) Hyperlipidemia LDL goal <70  Comment:   LDL Cholesterol Calculated   Date Value Ref Range Status   02/20/2017 87 0 - 99 mg/dL Final       Plan: Lipid Panel ([facility]), Hepatic Function Panel         (7) ([facility])            (K21.9) Gastroesophageal reflux disease without esophagitis  Comment:   Plan: CBC with Diff/Plt ([facility])            (Z51.81) Encounter for therapeutic drug monitoring  Comment:   Plan: Continue cares    (M25.551) Hip pain, right  Comment: schedule PT  Plan: meloxicam (MOBIC) 7.5 MG tablet        Deferred imaging at this time      Preventive Health Recommendations:       Male Ages 65 and over    Yearly exam:             See your health care provider every year in order to  o   Review health changes.   o   Discuss preventive care.    o   Review your medicines if your doctor has prescribed any.    Talk with your health care provider about whether you should have a test to screen for prostate cancer (PSA).    Every 3 years, have a diabetes test (fasting glucose). If you are at risk for diabetes, you should have this test more often.    Every 5 years, have a cholesterol test. Have this test more often if you are at risk for high cholesterol or heart disease.     Every 10 years, have a colonoscopy. Or, have a yearly FIT test (stool test). These exams will check for colon cancer.    Talk to with your health care provider about screening for Abdominal Aortic Aneurysm if you have a family history of AAA or have a history of smoking.  Shots:     Get a flu shot each year.     Get a tetanus shot every 10 years.     Talk to your  doctor about your pneumonia vaccines. There are now two you should receive - Pneumovax (PPSV 23) and Prevnar (PCV 13).    Talk to your pharmacist about a shingles vaccine.     Talk to your doctor about the hepatitis B vaccine.  Nutrition:     Eat at least 5 servings of fruits and vegetables each day.     Eat whole-grain bread, whole-wheat pasta and brown rice instead of white grains and rice.     Get adequate Calcium and Vitamin D.   Lifestyle    Exercise for at least 150 minutes a week (30 minutes a day, 5 days a week). This will help you control your weight and prevent disease.     Limit alcohol to one drink per day.     No smoking.     Wear sunscreen to prevent skin cancer.     See your dentist every six months for an exam and cleaning.     See your eye doctor every 1 to 2 years to screen for conditions such as glaucoma, macular degeneration and cataracts.    TD and shingrix  get this at the pharmacy (call insurance to check coverage on Shingrix vaccine)    PT for hip    Trial meloxicam with food once daily. Tylenol is ok. Not additional ASA, Ibuprofen or alleve with this.  Heat or ice which ever feels better  Salon Pas lidocaine 4% otc for pain    Continue diet and exercise as you are able    F/u one month for recheck hip    Labs today            Kayla Cordova MD  [facility]

## 2018-08-22 ENCOUNTER — OFFICE VISIT (OUTPATIENT)
Dept: FAMILY MEDICINE | Facility: CLINIC | Age: 73
End: 2018-08-22

## 2018-08-22 VITALS
WEIGHT: 223 LBS | SYSTOLIC BLOOD PRESSURE: 110 MMHG | RESPIRATION RATE: 16 BRPM | OXYGEN SATURATION: 99 % | HEART RATE: 76 BPM | HEIGHT: 69 IN | BODY MASS INDEX: 33.03 KG/M2 | DIASTOLIC BLOOD PRESSURE: 78 MMHG

## 2018-08-22 DIAGNOSIS — E78.5 HYPERLIPIDEMIA LDL GOAL <70: ICD-10-CM

## 2018-08-22 DIAGNOSIS — M25.551 HIP PAIN, RIGHT: ICD-10-CM

## 2018-08-22 DIAGNOSIS — Z87.891 FORMER SMOKER: ICD-10-CM

## 2018-08-22 DIAGNOSIS — Z00.00 MEDICARE ANNUAL WELLNESS VISIT, SUBSEQUENT: Primary | ICD-10-CM

## 2018-08-22 DIAGNOSIS — D69.3 IDIOPATHIC THROMBOCYTOPENIA (H): ICD-10-CM

## 2018-08-22 DIAGNOSIS — I25.10 CORONARY ARTERY DISEASE INVOLVING NATIVE CORONARY ARTERY OF NATIVE HEART WITHOUT ANGINA PECTORIS: ICD-10-CM

## 2018-08-22 DIAGNOSIS — Z51.81 ENCOUNTER FOR THERAPEUTIC DRUG MONITORING: ICD-10-CM

## 2018-08-22 DIAGNOSIS — Z23 NEED FOR TETANUS BOOSTER: ICD-10-CM

## 2018-08-22 DIAGNOSIS — B02.30 OCULAR HERPES: ICD-10-CM

## 2018-08-22 DIAGNOSIS — K21.9 GASTROESOPHAGEAL REFLUX DISEASE WITHOUT ESOPHAGITIS: ICD-10-CM

## 2018-08-22 LAB
% GRANULOCYTES: 78.2 % (ref 42.2–75.2)
HCT VFR BLD AUTO: 44 % (ref 39–51)
HEMOGLOBIN: 14.6 G/DL (ref 13.4–17.5)
LYMPHOCYTES NFR BLD AUTO: 16.9 % (ref 20.5–51.1)
MCH RBC QN AUTO: 29.4 PG (ref 27–31)
MCHC RBC AUTO-ENTMCNC: 33.1 G/DL (ref 33–37)
MCV RBC AUTO: 88.7 FL (ref 80–100)
MONOCYTES NFR BLD AUTO: 4.9 % (ref 1.7–9.3)
PLATELET # BLD AUTO: 188 K/UL (ref 140–450)
RBC # BLD AUTO: 4.96 X10/CMM (ref 4.2–5.9)
WBC # BLD AUTO: 8.2 X10/CMM (ref 3.8–11)

## 2018-08-22 PROCEDURE — G0439 PPPS, SUBSEQ VISIT: HCPCS | Performed by: FAMILY MEDICINE

## 2018-08-22 PROCEDURE — 85025 COMPLETE CBC W/AUTO DIFF WBC: CPT | Performed by: FAMILY MEDICINE

## 2018-08-22 PROCEDURE — 36415 COLL VENOUS BLD VENIPUNCTURE: CPT | Performed by: FAMILY MEDICINE

## 2018-08-22 PROCEDURE — 99213 OFFICE O/P EST LOW 20 MIN: CPT | Mod: 25 | Performed by: FAMILY MEDICINE

## 2018-08-22 RX ORDER — MELOXICAM 7.5 MG/1
7.5 TABLET ORAL DAILY
Qty: 30 TABLET | Refills: 1 | Status: SHIPPED | OUTPATIENT
Start: 2018-08-22 | End: 2018-11-19

## 2018-08-22 NOTE — MR AVS SNAPSHOT
After Visit Summary   8/22/2018    Lui Up    MRN: [id number]           Patient Information     Date Of Birth          1945        Visit Information        Provider Department      8/22/2018 7:45 AM Kayla Cordova MD [facility]        Today's Diagnoses     Medicare annual wellness visit, subsequent    -  1    Need for tetanus booster        BMI 32.0-32.9,adult        Ocular herpes        Coronary artery disease involving native coronary artery of native heart without angina pectoris        Idiopathic thrombocytopenia (H)        Hyperlipidemia LDL goal <70        Gastroesophageal reflux disease without esophagitis        Encounter for therapeutic drug monitoring        Hip pain, right          Care Instructions      Preventive Health Recommendations:       Male Ages 65 and over    Yearly exam:             See your health care provider every year in order to  o   Review health changes.   o   Discuss preventive care.    o   Review your medicines if your doctor has prescribed any.    Talk with your health care provider about whether you should have a test to screen for prostate cancer (PSA).    Every 3 years, have a diabetes test (fasting glucose). If you are at risk for diabetes, you should have this test more often.    Every 5 years, have a cholesterol test. Have this test more often if you are at risk for high cholesterol or heart disease.     Every 10 years, have a colonoscopy. Or, have a yearly FIT test (stool test). These exams will check for colon cancer.    Talk to with your health care provider about screening for Abdominal Aortic Aneurysm if you have a family history of AAA or have a history of smoking.  Shots:     Get a flu shot each year.     Get a tetanus shot every 10 years.     Talk to your doctor about your pneumonia vaccines. There are now two you should receive - Pneumovax (PPSV 23) and Prevnar (PCV 13).    Talk to your pharmacist about a shingles vaccine.      Talk to your doctor about the hepatitis B vaccine.  Nutrition:     Eat at least 5 servings of fruits and vegetables each day.     Eat whole-grain bread, whole-wheat pasta and brown rice instead of white grains and rice.     Get adequate Calcium and Vitamin D.   Lifestyle    Exercise for at least 150 minutes a week (30 minutes a day, 5 days a week). This will help you control your weight and prevent disease.     Limit alcohol to one drink per day.     No smoking.     Wear sunscreen to prevent skin cancer.     See your dentist every six months for an exam and cleaning.     See your eye doctor every 1 to 2 years to screen for conditions such as glaucoma, macular degeneration and cataracts.    TD and shingrix  get this at the pharmacy (call insurance to check coverage on Shingrix vaccine)    PT for hip    Trial meloxicam with food once daily. Tylenol is ok. Not additional ASA, Ibuprofen or alleve with this.  Heat or ice which ever feels better  Salon Pas lidocaine 4% otc for pain    Continue diet and exercise as you are able    F/u one month for recheck hip    Labs today                Follow-ups after your visit        Who to contact     If you have questions or need follow up information about today's clinic visit or your schedule please contact [facility] directly at [phone].  Normal or non-critical lab and imaging results will be communicated to you by Microarrayshart, letter or phone within 4 business days after the clinic has received the results. If you do not hear from us within 7 days, please contact the clinic through [employer] or phone. If you have a critical or abnormal lab result, we will notify you by phone as soon as possible.  Submit refill requests through [employer] or call your pharmacy and they will forward the refill request to us. Please allow 3 business days for your refill to be completed.          Additional Information About Your Visit        [employer] Information     [employer] gives you  "secure access to your electronic health record. If you see a primary care provider, you can also send messages to your care team and make appointments. If you have questions, please call your primary care clinic.  If you do not have a primary care provider, please call [phone] and they will assist you.        Care EveryWhere ID     This is your Care EveryWhere ID. This could be used by other organizations to access your [facility] medical records  [id number]        Your Vitals Were     Pulse Respirations Height Pulse Oximetry BMI (Body Mass Index)       76 16 1.753 m (5' 9\") 99% 32.93 kg/m2        Blood Pressure from Last 3 Encounters:   08/22/18 110/78   01/09/18 132/82   02/20/17 112/78    Weight from Last 3 Encounters:   08/22/18 101.2 kg (223 lb)   01/09/18 104.3 kg (230 lb)   02/20/17 101.9 kg (224 lb 9.6 oz)              We Performed the Following     Basic Metabolic Panel (8) ([facility])     CBC with Diff/Plt ([facility])     Hepatic Function Panel (7) ([facility])     Lipid Panel ([facility])          Today's Medication Changes          These changes are accurate as of 8/22/18  8:39 AM.  If you have any questions, ask your nurse or doctor.               Start taking these medicines.        Dose/Directions    meloxicam 7.5 MG tablet   Commonly known as:  MOBIC   Used for:  Hip pain, right   Started by:  Kayla Cordova MD        Dose:  7.5 mg   Take 1 tablet (7.5 mg) by mouth daily   Quantity:  30 tablet   Refills:  1            Where to get your medicines      These medications were sent to [place] [address] [place] [address]  [address] [place], [place] [address] [address]     Phone:  [phone]     meloxicam 7.5 MG tablet                Primary Care Provider Office Phone # Fax #    Kayla Cordova [phone] [address]  [facility] [address]        Equal Access to Services     JULIETH GERARDO AH: Hadii aad ku hadasho Soomaali, waaxda luqadaha, qaybta kaalmada rashardda, ananth " merissa lernerlakshmi derasaan ah. So [facility] [phone].    ATENCIÓN: Si habla esequiel, tiene a bull disposición servicios gratuitos de asistencia lingüística. Raghav al [phone].    We comply with applicable federal civil rights laws and [place] laws. We do not discriminate on the basis of race, color, national origin, age, disability, sex, sexual orientation, or gender identity.            Thank you!     Thank you for choosing [facility]  for your care. Our goal is always to provide you with excellent care. Hearing back from our patients is one way we can continue to improve our services. Please take a few minutes to complete the written survey that you may receive in the mail after your visit with us. Thank you!             Your Updated Medication List - Protect others around you: Learn how to safely use, store and throw away your medicines at www.disposemymeds.org.          This list is accurate as of 8/22/18  8:39 AM.  Always use your most recent med list.                   Brand Name Dispense Instructions for use Diagnosis    acyclovir 400 MG tablet    ZOVIRAX    180 tablet    Take 1 tablet (400 mg) by mouth 2 times daily    Ocular herpes       aspirin 81 MG tablet      Take 1 tablet by mouth every morning.        atorvastatin 80 MG tablet    LIPITOR    90 tablet    Take 1 tablet (80 mg) by mouth daily    Coronary artery disease involving native coronary artery of native heart without angina pectoris       meloxicam 7.5 MG tablet    MOBIC    30 tablet    Take 1 tablet (7.5 mg) by mouth daily    Hip pain, right       MULTI-VITAMINS Tabs      Take by mouth daily        PEPCID COMPLETE PO      Take 1 tablet by mouth as needed.

## 2018-08-23 LAB
ALBUMIN SERPL-MCNC: 4.3 G/DL (ref 3.5–4.8)
ALP SERPL-CCNC: 102 IU/L (ref 39–117)
ALT SERPL-CCNC: 17 IU/L (ref 0–44)
AST SERPL-CCNC: 16 IU/L (ref 0–40)
BILIRUB SERPL-MCNC: 1.1 MG/DL (ref 0–1.2)
BILIRUBIN, DIRECT: 0.28 MG/DL (ref 0–0.4)
BUN SERPL-MCNC: 14 MG/DL (ref 8–27)
BUN/CREATININE RATIO: 12 (ref 10–24)
CALCIUM SERPL-MCNC: 9.7 MG/DL (ref 8.6–10.2)
CHLORIDE SERPLBLD-SCNC: 104 MMOL/L (ref 96–106)
CHOLEST SERPL-MCNC: 160 MG/DL (ref 100–199)
CREAT SERPL-MCNC: 1.21 MG/DL (ref 0.76–1.27)
EGFR IF AFRICN AM: 68 ML/MIN/1.73
EGFR IF NONAFRICN AM: 59 ML/MIN/1.73
GLUCOSE SERPL-MCNC: 105 MG/DL (ref 65–99)
HDLC SERPL-MCNC: 59 MG/DL
LDL/HDL RATIO: 1.5 RATIO (ref 0–3.6)
LDLC SERPL CALC-MCNC: 88 MG/DL (ref 0–99)
POTASSIUM SERPL-SCNC: 4.9 MMOL/L (ref 3.5–5.2)
PROT SERPL-MCNC: 6.3 G/DL (ref 6–8.5)
SODIUM SERPL-SCNC: 144 MMOL/L (ref 134–144)
TOTAL CO2: 26 MMOL/L (ref 20–29)
TRIGL SERPL-MCNC: 66 MG/DL (ref 0–149)
VLDLC SERPL CALC-MCNC: 13 MG/DL (ref 5–40)

## 2018-08-24 NOTE — PROGRESS NOTES
Slight elevation in sugar and decrease in GFR. Creatinine was fine. (kidney test)  Lipids are good.  Liver tests were fine  CBC %lymphocytes slightly down, otherwise fine.   No new action needed at this time.

## 2018-08-28 ENCOUNTER — TRANSFERRED RECORDS (OUTPATIENT)
Dept: FAMILY MEDICINE | Facility: CLINIC | Age: 73
End: 2018-08-28

## 2018-09-13 ENCOUNTER — HOSPITAL REPORTS SCAN (OUTPATIENT)
Dept: FAMILY MEDICINE | Facility: CLINIC | Age: 73
End: 2018-09-13
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
## 2018-09-21 ENCOUNTER — OFFICE VISIT (OUTPATIENT)
Dept: FAMILY MEDICINE | Facility: CLINIC | Age: 73
End: 2018-09-21

## 2018-09-21 VITALS
DIASTOLIC BLOOD PRESSURE: 68 MMHG | WEIGHT: 228 LBS | BODY MASS INDEX: 33.67 KG/M2 | OXYGEN SATURATION: 92 % | RESPIRATION RATE: 16 BRPM | HEART RATE: 65 BPM | SYSTOLIC BLOOD PRESSURE: 118 MMHG

## 2018-09-21 DIAGNOSIS — M25.551 HIP PAIN, RIGHT: Primary | ICD-10-CM

## 2018-09-21 PROCEDURE — 99213 OFFICE O/P EST LOW 20 MIN: CPT | Performed by: FAMILY MEDICINE

## 2018-09-21 NOTE — MR AVS SNAPSHOT
After Visit Summary   9/21/2018    Lui Up    MRN: [id number]           Patient Information     Date Of Birth          1945        Visit Information        Provider Department      9/21/2018 4:00 PM Kayla Cordova MD [facility]        Today's Diagnoses     Hip pain, right    -  1      Care Instructions    Referral to [facility] for 2nd opinion and possible right bursa injection          Follow-ups after your visit        Who to contact     If you have questions or need follow up information about today's clinic visit or your schedule please contact [facility] directly at [phone].  Normal or non-critical lab and imaging results will be communicated to you by Quotefishhart, letter or phone within 4 business days after the clinic has received the results. If you do not hear from us within 7 days, please contact the clinic through [employer] or phone. If you have a critical or abnormal lab result, we will notify you by phone as soon as possible.  Submit refill requests through Wami or call your pharmacy and they will forward the refill request to us. Please allow 3 business days for your refill to be completed.          Additional Information About Your Visit        MyChart Information     W[employer] gives you secure access to your electronic health record. If you see a primary care provider, you can also send messages to your care team and make appointments. If you have questions, please call your primary care clinic.  If you do not have a primary care provider, please call [phone] and they will assist you.        Care EveryWhere ID     This is your Care EveryWhere ID. This could be used by other organizations to access your [facility] medical records  [id number]        Your Vitals Were     Pulse Respirations Pulse Oximetry BMI (Body Mass Index)          65 16 92% 33.67 kg/m2         Blood Pressure from Last 3 Encounters:   09/21/18 118/68   08/22/18 110/78   01/09/18  132/82    Weight from Last 3 Encounters:   09/21/18 103.4 kg (228 lb)   08/22/18 101.2 kg (223 lb)   01/09/18 104.3 kg (230 lb)              Today, you had the following     No orders found for display       Primary Care Provider Office Phone # Fax #    Kayla KRISTIN MD [facility] [phone] [address]  [facility] [address]        Equal Access to Services     KEAGAN GERARDO : Hadii aad ku hadasho Soomaali, waaxda luqadaha, qaybta kaalmada adeegyada, waxay idiin hayaan adeeg kharash la'aan . So [facility] [phone].    ATENCIÓN: Si habla español, tiene a bull disposición servicios gratuitos de asistencia lingüística. [facility] [phone].    We comply with applicable federal civil rights laws and [place] laws. We do not discriminate on the basis of race, color, national origin, age, disability, sex, sexual orientation, or gender identity.            Thank you!     Thank you for choosing [facility]  for your care. Our goal is always to provide you with excellent care. Hearing back from our patients is one way we can continue to improve our services. Please take a few minutes to complete the written survey that you may receive in the mail after your visit with us. Thank you!             Your Updated Medication List - Protect others around you: Learn how to safely use, store and throw away your medicines at www.disposemymeds.org.          This list is accurate as of 9/21/18  6:02 PM.  Always use your most recent med list.                   Brand Name Dispense Instructions for use Diagnosis    acyclovir 400 MG tablet    ZOVIRAX    180 tablet    Take 1 tablet (400 mg) by mouth 2 times daily    Ocular herpes       aspirin 81 MG tablet      Take 1 tablet by mouth every morning.        atorvastatin 80 MG tablet    LIPITOR    90 tablet    Take 1 tablet (80 mg) by mouth daily    Coronary artery disease involving native coronary artery of native heart without angina pectoris       meloxicam 7.5 MG  tablet    MOBIC    30 tablet    Take 1 tablet (7.5 mg) by mouth daily    Hip pain, right       MULTI-VITAMINS Tabs      Take by mouth daily        PEPCID COMPLETE PO      Take 1 tablet by mouth as needed.

## 2018-11-16 NOTE — PATIENT INSTRUCTIONS
Before Your Surgery      Call your surgeon if there is any change in your health. This includes signs of a cold or flu (such as a sore throat, runny nose, cough, rash or fever).    Do not smoke, drink alcohol or take over the counter medicine (unless your surgeon or primary care doctor tells you to) for the 24 hours before and after surgery.    If you take prescribed drugs: Follow your doctor s orders about which medicines to take and which to stop until after surgery.    Eating and drinking prior to surgery: follow the instructions from your surgeon    Take a shower or bath the night before surgery. Use the soap your surgeon gave you to gently clean your skin. If you do not have soap from your surgeon, use your regular soap. Do not shave or scrub the surgery site.  Wear clean pajamas and have clean sheets on your bed.     ASA, Multivitamins and Pepcid HOLD  HOLD LIPITOR DAY OF SURGERY resume regular time when eating  Acyclovir Day of surgery with small water day of surgery, then resume regular times when eating    Lab today  EKG

## 2018-11-16 NOTE — PROGRESS NOTES
[facility]  [address] [place] [id number] [phone]  Dept: [phone]    PRE-OP EVALUATION:  Today's date: 2018    Lui Up (: 1945) presents for pre-operative evaluation assessment as requested by Dr. Reeves.  He requires evaluation and anesthesia risk assessment prior to undergoing surgery/procedure for treatment of Back .    Proposed Surgery/ Procedure: Back T1-2 L1 Hemilaminectomoy Microdiscectomy  Date of Surgery/ Procedure: 2018  Time of Surgery/ Procedure: [facility]  Hospital/Surgical Facility: [facility]  Fax number for surgical facility: [phone]  Primary Physician: Kayla Cordova  Type of Anesthesia Anticipated: to be determined    Patient has a Health Care Directive or Living Will:  YES     1. NO - Do you have a history of heart attack, stroke, stent, bypass or surgery on an artery in the head, neck, heart or legs?  2. NO - Do you ever have any pain or discomfort in your chest?  3. NO - Do you have a history of  Heart Failure?  4. NO - Are you troubled by shortness of breath when: walking on the level, up a slight hill or at night?  5. NO - Do you currently have a cold, bronchitis or other respiratory infection?  6. NO - Do you have a cough, shortness of breath or wheezing?  7. NO - Do you sometimes get pains in the calves of your legs when you walk?  8. NO - Do you or anyone in your family have previous history of blood clots?  9. NO - Do you or does anyone in your family have a serious bleeding problem such as prolonged bleeding following surgeries or cuts?  10. NO - Have you ever had problems with anemia or been told to take iron pills?  11. NO - Have you had any abnormal blood loss such as black, tarry or bloody stools, or abnormal vaginal bleeding?  12. NO - Have you ever had a blood transfusion?  13. NO - Have you or any of your relatives ever had problems with anesthesia?  14. NO - Do you have sleep apnea, excessive  snoring or daytime drowsiness?  15. NO - Do you have any prosthetic heart valves?  16. NO - Do you have prosthetic joints?  17. NO - Is there any chance that you may be pregnant?    Sleep in bed a lot more Lying help back, Right side best for pain but worse for GERD. Body pillow, block between legs. Carrying rug and reaching to change light increased pain. 1/10 now.  Appetite too good  Exercise limited walking feels good    Smoking former  ETOH last supper last night one beer, 2 per day max  Street drugs/MJ no  Caffeine yes    Depression no  Anxiety no  Panic no  SI/SP no  Hallucinations no  Paranoid no  Manic no  OCD no  PTSD no  Gambling no  Guns 2 yes, not locked, ammo not separate Lock combination box when kids around or traveling    Cardiology 2007 normal stress echo    Bilateral hearing aids  Glasses  Crowns    Double bone graft upper jaw        OTC asa 81, pepcid complete, tylenol  Latex allergy no    Travel no  Exposure no     ROS: 10 point ROS neg other than the symptoms noted above in the HPI.  /74  Pulse 68  Resp 16  Wt 103.4 kg (228 lb)  SpO2 97%  BMI 33.67 kg/m2  Exam:  Constitutional: healthy, alert and no distress  Head: Normocephalic. No masses, lesions, tenderness or abnormalities  ENT: ENT exam normal, no neck nodes or sinus tenderness  Cardiovascular: negative, PMI normal. No lifts, heaves, or thrills. RRR. No murmurs, clicks gallops or rub  Respiratory: negative, Percussion normal. Good diaphragmatic excursion. Lungs clear  Gastrointestinal: Abdomen soft, non-tender. BS normal. No masses, organomegaly  : Deferred  Musculoskeletal: extremities normal- no gross deformities noted, gait normal and normal muscle tone  Skin: no suspicious lesions or rashes  Neurologic: Gait normal. Reflexes normal and symmetric. Sensation grossly WNL.  Psychiatric: mentation appears normal and affect normal/bright  Hematologic/Lymphatic/Immunologic: Normal cervical lymph nodes      EKG NSR Inferior infarct  age undetermined      HPI:     HPI related to upcoming procedure: T12-L1 right hemilaminectomy microdiscectomy      See problem list for active medical problems.  Problems all longstanding and stable, except as noted/documented.  See ROS for pertinent symptoms related to these conditions.                                                                                                                                                          .    MEDICAL HISTORY:     Patient Active Problem List    Diagnosis Date Noted     Coronary artery disease involving native coronary artery of native heart without angina pectoris 02/20/2017     Priority: Medium     Advance Care Planning  01/27/2016     Priority: Medium     Advance Care Planning 3/21/2016: Receipt of ACP document:  Received: Health Care Directive which was witnessed or notarized on 2-15-16.  Document previously scanned on 3-15-16.  Validation form completed and sent to be scanned.  Code Status needs to be updated to reflect choices in most recent ACP document..  Confirmed/documented designated decision maker(s).  Added by Hillary Love RN, System Director ACP-Honoring Choices   Advance Care Planning 1/27/2016: Patient states they have received and completed their Advance Care directive and the document has already been scanned into their chart on today January 27, 2016         Herpes ocular 01/27/2016     Priority: Medium     GERD (gastroesophageal reflux disease)      Priority: Medium     Hyperlipidemia LDL goal <70      Priority: Medium     Idiopathic thrombocytopenia (H) 08/04/2011     Priority: Medium      Past Medical History:   Diagnosis Date     Arthritis of knee      CAD (coronary artery disease)     based on calcium on ct     CKD (chronic kidney disease) stage 2, GFR 60-89 ml/min 8/4/2011     Colonic polyp      Diverticula of colon      GERD (gastroesophageal reflux disease)      Hearing loss on left      Herpes simplex, ocular     history of herpes,  prophylaxtic per ophtho     Hyperlipidemia LDL goal <70      Thrombocytopenia (H)      Past Surgical History:   Procedure Laterality Date     ARTHROSCOPY KNEE  1995    left     HEMICOLECTOMY, RT/LT  2/2009    right Laprocopic     HERNIORRHAPHY INCISIONAL (LOCATION)  10/2010     LAMINECTOMY LUMBAR ONE LEVEL  1991,1998,2000    3 surgeriies      Current Outpatient Prescriptions   Medication Sig Dispense Refill     acyclovir (ZOVIRAX) 400 MG tablet Take 1 tablet (400 mg) by mouth 2 times daily 180 tablet 3     aspirin 81 MG tablet Take 1 tablet by mouth every morning.       atorvastatin (LIPITOR) 80 MG tablet Take 1 tablet (80 mg) by mouth daily 90 tablet 3     Famotidine-Ca Carb-Mag Hydrox (PEPCID COMPLETE PO) Take 1 tablet by mouth as needed.       meloxicam (MOBIC) 7.5 MG tablet Take 1 tablet (7.5 mg) by mouth daily 30 tablet 1     Multiple Vitamin (MULTI-VITAMINS) TABS Take by mouth daily       OTC products: asa 81, pepcid complete, tylenol    No Known Allergies   Latex Allergy: NO    Social History   Substance Use Topics     Smoking status: Former Smoker     Types: Cigarettes     Quit date: 7/26/1971     Smokeless tobacco: Never Used      Comment: quit in [facility]     Alcohol use 3.0 - 4.2 oz/week     5 - 7 Cans of beer per week     History   Drug Use No         DIAGNOSTICS:   EKG: appears normal, NSR, inferior infarct of questionable age (computer read)    Recent Labs   Lab Test  08/22/18   1024  08/22/18   0946  02/20/17   1023  02/20/17   1002   HGB   --   14.6  14.9   --    PLT   --   188  198   --    NA  144   --    --   140   POTASSIUM  4.9   --    --   4.4   CR  1.21   --    --   1.07      Results for orders placed or performed in visit on 11/19/18   Basic Metabolic Panel (8) ([facility])   Result Value Ref Range    Glucose 121 (H) 65 - 99 mg/dL    Urea Nitrogen 11 8 - 27 mg/dL    Creatinine 1.06 0.76 - 1.27 mg/dL    eGFR If NonAfricn Am 69 >59 mL/min/1.73    eGFR If Africn Am 80 >59 mL/min/1.73    BUN/Creatinine  Ratio 10 10 - 24    Sodium 144 134 - 144 mmol/L    Potassium 4.4 3.5 - 5.2 mmol/L    Chloride 103 96 - 106 mmol/L    Total CO2 29 20 - 29 mmol/L    Calcium 9.6 8.6 - 10.2 mg/dL    Narrative    Performed at:  01 - [facility] [address], [place], [place]  [id number]  : Johnny Amos MD, Phone:  [phone]   Lipid Panel ([facility])   Result Value Ref Range    Cholesterol 159 100 - 199 mg/dL    Triglycerides 81 0 - 149 mg/dL    HDL Cholesterol 61 >39 mg/dL    VLDL Cholesterol Sulaiman 16 5 - 40 mg/dL    LDL Cholesterol Calculated 82 0 - 99 mg/dL    LDL/HDL Ratio 1.3 0.0 - 3.6 ratio    Narrative    Performed at:  01 - [facility] [address], [place], [place]  [id number]  : Johnny Amos MD, Phone:  [phone]   CBC with Diff/Plt ([facility])   Result Value Ref Range    WBC x10/cmm 7.1 3.8 - 11.0 x10/cmm    % Lymphocytes 24.3 20.5 - 51.1 %    % Monocytes 7.2 1.7 - 9.3 %    % Granulocytes 68.5 42.2 - 75.2 %    RBC x10/cmm 4.90 4.2 - 5.9 x10/cmm    Hemoglobin 14.4 13.4 - 17.5 g/dl    Hematocrit 43.9 39 - 51 %    MCV 89.6 80 - 100 fL    MCH 29.4 27.0 - 31.0 pg    MCHC 32.8 (A) 33.0 - 37.0 g/dL    Platelet Count 197 140 - 450 K/uL   PSA Serum ([facility])   Result Value Ref Range    PSA NG/ML 2.8 0.0 - 4.0 ng/mL    Narrative    Performed at:  01 - [facility] [address], [place], [place]  [id number]  : Johnny Amos MD, Phone:  [phone]   Vitamin D  25-Hydroxy ([facility])   Result Value Ref Range    Vitamin D,25-Hydroxy 34.1 30.0 - 100.0 ng/mL    Narrative    Performed at:  01 - [facility] [address], [place], [place]  [id number]  : Johnny Amos MD, Phone:  [phone]       IMPRESSION:   Reason for surgery/procedure: Back T1-2 L1 Hemilaminectomoy Microdiscectomy  Diagnosis/reason for consult: preoperative clearance    The proposed surgical procedure is considered INTERMEDIATE risk.    REVISED CARDIAC RISK INDEX  The patient has the following serious cardiovascular  risks for perioperative complications such as (MI, PE, VFib and 3  AV Block):  No serious cardiac risks  INTERPRETATION: 1 risks: Class II (low risk - 0.9% complication rate)    The patient has the following additional risks for perioperative complications:  No identified additional risks      ICD-10-CM    1. Preop general physical exam Z01.818 EKG 12-lead complete w/read - Clinics   2. Former smoker, stopped smoking in distant past Z87.891    3. Idiopathic thrombocytopenia (H) D69.3 CBC with Diff/Plt ([facility])   4. Hyperlipidemia LDL goal <70 E78.5 Lipid Panel ([facility])   5. Gastroesophageal reflux disease without esophagitis K21.9    6. Herpes ocular B02.30    7. Coronary artery disease involving native coronary artery of native heart without angina pectoris I25.10 EKG 12-lead complete w/read - Clinics     Basic Metabolic Panel (8) ([facility])   8. Screening for prostate cancer Z12.5 PSA Serum ([facility])   9. Vitamin D deficiency E55.9 Vitamin D  25-Hydroxy ([facility])   10. Chronic right-sided thoracic back pain M54.6     G89.29        RECOMMENDATIONS:     --Consult hospital rounder / IM to assist post-op medical management    Cardiovascular Risk  Monitor  LDL Cholesterol Calculated   Date Value Ref Range Status   11/19/2018 82 0 - 99 mg/dL Final       Before Your Surgery      Call your surgeon if there is any change in your health. This includes signs of a cold or flu (such as a sore throat, runny nose, cough, rash or fever).    Do not smoke, drink alcohol or take over the counter medicine (unless your surgeon or primary care doctor tells you to) for the 24 hours before and after surgery.    If you take prescribed drugs: Follow your doctor s orders about which medicines to take and which to stop until after surgery.    Eating and drinking prior to surgery: follow the instructions from your surgeon    Take a shower or bath the night before surgery. Use the soap your surgeon gave you to gently clean your skin. If you do  not have soap from your surgeon, use your regular soap. Do not shave or scrub the surgery site.  Wear clean pajamas and have clean sheets on your bed.     ASA, Multivitamins and Pepcid HOLD  HOLD LIPITOR DAY OF SURGERY resume regular time when eating  Acyclovir Day of surgery with small water day of surgery, then resume regular times when eating    Lab today  EKG                    --Patient is to take all scheduled medications on the day of surgery EXCEPT for modifications listed below.    APPROVAL GIVEN to proceed with proposed procedure, without further diagnostic evaluation       Signed Electronically by: Kayla Cordova MD    Copy of this evaluation report is provided to requesting physician.    [facility] Preop Guidelines    Revised Cardiac Risk Index

## 2018-11-19 ENCOUNTER — OFFICE VISIT (OUTPATIENT)
Dept: FAMILY MEDICINE | Facility: CLINIC | Age: 73
End: 2018-11-19

## 2018-11-19 VITALS
RESPIRATION RATE: 16 BRPM | OXYGEN SATURATION: 97 % | SYSTOLIC BLOOD PRESSURE: 118 MMHG | DIASTOLIC BLOOD PRESSURE: 74 MMHG | WEIGHT: 228 LBS | BODY MASS INDEX: 33.67 KG/M2 | HEART RATE: 68 BPM

## 2018-11-19 DIAGNOSIS — I25.10 CORONARY ARTERY DISEASE INVOLVING NATIVE CORONARY ARTERY OF NATIVE HEART WITHOUT ANGINA PECTORIS: ICD-10-CM

## 2018-11-19 DIAGNOSIS — G89.29 CHRONIC RIGHT-SIDED THORACIC BACK PAIN: ICD-10-CM

## 2018-11-19 DIAGNOSIS — M54.6 CHRONIC RIGHT-SIDED THORACIC BACK PAIN: ICD-10-CM

## 2018-11-19 DIAGNOSIS — E55.9 VITAMIN D DEFICIENCY: ICD-10-CM

## 2018-11-19 DIAGNOSIS — E78.5 HYPERLIPIDEMIA LDL GOAL <70: ICD-10-CM

## 2018-11-19 DIAGNOSIS — Z87.891 FORMER SMOKER, STOPPED SMOKING IN DISTANT PAST: ICD-10-CM

## 2018-11-19 DIAGNOSIS — Z01.818 PREOP GENERAL PHYSICAL EXAM: Primary | ICD-10-CM

## 2018-11-19 DIAGNOSIS — Z12.5 SCREENING FOR PROSTATE CANCER: ICD-10-CM

## 2018-11-19 DIAGNOSIS — D69.3 IDIOPATHIC THROMBOCYTOPENIA (H): ICD-10-CM

## 2018-11-19 DIAGNOSIS — B02.30 HERPES OCULAR: ICD-10-CM

## 2018-11-19 DIAGNOSIS — K21.9 GASTROESOPHAGEAL REFLUX DISEASE WITHOUT ESOPHAGITIS: ICD-10-CM

## 2018-11-19 LAB
% GRANULOCYTES: 68.5 % (ref 42.2–75.2)
HCT VFR BLD AUTO: 43.9 % (ref 39–51)
HEMOGLOBIN: 14.4 G/DL (ref 13.4–17.5)
LYMPHOCYTES NFR BLD AUTO: 24.3 % (ref 20.5–51.1)
MCH RBC QN AUTO: 29.4 PG (ref 27–31)
MCHC RBC AUTO-ENTMCNC: 32.8 G/DL (ref 33–37)
MCV RBC AUTO: 89.6 FL (ref 80–100)
MONOCYTES NFR BLD AUTO: 7.2 % (ref 1.7–9.3)
PLATELET # BLD AUTO: 197 K/UL (ref 140–450)
RBC # BLD AUTO: 4.9 X10/CMM (ref 4.2–5.9)
WBC # BLD AUTO: 7.1 X10/CMM (ref 3.8–11)

## 2018-11-19 PROCEDURE — 99215 OFFICE O/P EST HI 40 MIN: CPT | Performed by: FAMILY MEDICINE

## 2018-11-19 PROCEDURE — 93000 ELECTROCARDIOGRAM COMPLETE: CPT | Performed by: FAMILY MEDICINE

## 2018-11-19 PROCEDURE — 36415 COLL VENOUS BLD VENIPUNCTURE: CPT | Performed by: FAMILY MEDICINE

## 2018-11-19 PROCEDURE — 85025 COMPLETE CBC W/AUTO DIFF WBC: CPT | Performed by: FAMILY MEDICINE

## 2018-11-19 NOTE — MR AVS SNAPSHOT
After Visit Summary   11/19/2018    Lui Up    MRN: [id number]           Patient Information     Date Of Birth          1945        Visit Information        Provider Department      11/19/2018 1:30 PM Kayla Cordova MD [facility]        Today's Diagnoses     Preop general physical exam    -  1    Former smoker, stopped smoking in distant past        Idiopathic thrombocytopenia (H)        Hyperlipidemia LDL goal <70        Gastroesophageal reflux disease without esophagitis        Herpes ocular        Coronary artery disease involving native coronary artery of native heart without angina pectoris        Screening for prostate cancer        Vitamin D deficiency        Chronic right-sided thoracic back pain          Care Instructions      Before Your Surgery      Call your surgeon if there is any change in your health. This includes signs of a cold or flu (such as a sore throat, runny nose, cough, rash or fever).    Do not smoke, drink alcohol or take over the counter medicine (unless your surgeon or primary care doctor tells you to) for the 24 hours before and after surgery.    If you take prescribed drugs: Follow your doctor s orders about which medicines to take and which to stop until after surgery.    Eating and drinking prior to surgery: follow the instructions from your surgeon    Take a shower or bath the night before surgery. Use the soap your surgeon gave you to gently clean your skin. If you do not have soap from your surgeon, use your regular soap. Do not shave or scrub the surgery site.  Wear clean pajamas and have clean sheets on your bed.     ASA, Multivitamins and Pepcid HOLD  HOLD LIPITOR DAY OF SURGERY resume regular time when eating  Acyclovir Day of surgery with small water day of surgery, then resume regular times when eating    Lab today  EKG                      Follow-ups after your visit        Who to contact     If you have questions or need follow  up information about today's clinic visit or your schedule please contact [facility] directly at [phone].  Normal or non-critical lab and imaging results will be communicated to you by [employer]on[employer], letter or phone within 4 business days after the clinic has received the results. If you do not hear from us within 7 days, please contact the clinic through [employer] or phone. If you have a critical or abnormal lab result, we will notify you by phone as soon as possible.  Submit refill requests through Panna or call your pharmacy and they will forward the refill request to us. Please allow 3 business days for your refill to be completed.          Additional Information About Your Visit        [employer] Information     Panna gives you secure access to your electronic health record. If you see a primary care provider, you can also send messages to your care team and make appointments. If you have questions, please call your primary care clinic.  If you do not have a primary care provider, please call [phone] and they will assist you.        Care EveryWhere ID     This is your Care EveryWhere ID. This could be used by other organizations to access your [place] medical records  [id number]        Your Vitals Were     Pulse Respirations Pulse Oximetry BMI (Body Mass Index)          68 16 97% 33.67 kg/m2         Blood Pressure from Last 3 Encounters:   11/19/18 118/74   09/21/18 118/68   08/22/18 110/78    Weight from Last 3 Encounters:   11/19/18 103.4 kg (228 lb)   09/21/18 103.4 kg (228 lb)   08/22/18 101.2 kg (223 lb)              We Performed the Following     Basic Metabolic Panel (8) ([facility])     CBC with Diff/Plt ([facility])     EKG 12-lead complete w/read - Clinics     Lipid Panel ([facility])     PSA Serum ([facility])     Vitamin D  25-Hydroxy ([facility])        Primary Care Provider Office Phone # Fax #    Kayla Cordova [phone] [address]  [facility] [address]         Equal Access to Services     [facility] Center[facility]ARAH : Hadii aad ku hadalma rosaeliseo Desireeraghu, warosinada luqadaha, qaybta kaamieananth stubbs. So [facility] [phone].    ATENCIÓN: Si habla español, tiene a bull disposición servicios gratuitos de asistencia lingüística. Llame al [phone].    We comply with applicable federal civil rights laws and [place] laws. We do not discriminate on the basis of race, color, national origin, age, disability, sex, sexual orientation, or gender identity.            Thank you!     Thank you for choosing [facility]  for your care. Our goal is always to provide you with excellent care. Hearing back from our patients is one way we can continue to improve our services. Please take a few minutes to complete the written survey that you may receive in the mail after your visit with us. Thank you!             Your Updated Medication List - Protect others around you: Learn how to safely use, store and throw away your medicines at www.disposemymeds.org.          This list is accurate as of 11/19/18  1:38 PM.  Always use your most recent med list.                   Brand Name Dispense Instructions for use Diagnosis    acyclovir 400 MG tablet    ZOVIRAX    180 tablet    Take 1 tablet (400 mg) by mouth 2 times daily    Ocular herpes       aspirin 81 MG tablet      Take 1 tablet by mouth every morning.        atorvastatin 80 MG tablet    LIPITOR    90 tablet    Take 1 tablet (80 mg) by mouth daily    Coronary artery disease involving native coronary artery of native heart without angina pectoris       MULTI-VITAMINS Tabs      Take by mouth daily        PEPCID COMPLETE PO      Take 1 tablet by mouth as needed.

## 2018-11-20 LAB
BUN SERPL-MCNC: 11 MG/DL (ref 8–27)
BUN/CREATININE RATIO: 10 (ref 10–24)
CALCIUM SERPL-MCNC: 9.6 MG/DL (ref 8.6–10.2)
CHLORIDE SERPLBLD-SCNC: 103 MMOL/L (ref 96–106)
CHOLEST SERPL-MCNC: 159 MG/DL (ref 100–199)
CREAT SERPL-MCNC: 1.06 MG/DL (ref 0.76–1.27)
EGFR IF AFRICN AM: 80 ML/MIN/1.73
EGFR IF NONAFRICN AM: 69 ML/MIN/1.73
GLUCOSE SERPL-MCNC: 121 MG/DL (ref 65–99)
HDLC SERPL-MCNC: 61 MG/DL
LDL/HDL RATIO: 1.3 RATIO (ref 0–3.6)
LDLC SERPL CALC-MCNC: 82 MG/DL (ref 0–99)
POTASSIUM SERPL-SCNC: 4.4 MMOL/L (ref 3.5–5.2)
PSA NG/ML: 2.8 NG/ML (ref 0–4)
SODIUM SERPL-SCNC: 144 MMOL/L (ref 134–144)
TOTAL CO2: 29 MMOL/L (ref 20–29)
TRIGL SERPL-MCNC: 81 MG/DL (ref 0–149)
VITAMIN D, 25-HYDROXY: 34.1 NG/ML (ref 30–100)
VLDLC SERPL CALC-MCNC: 16 MG/DL (ref 5–40)

## 2018-11-21 NOTE — PROGRESS NOTES
Mild increased sugar otherwise BMP was fine  Lipids were good  PSA increased some but still in normal range.  Vitamin D was normal

## 2018-12-01 ENCOUNTER — HOSPITAL REPORTS SCAN (OUTPATIENT)
Dept: FAMILY MEDICINE | Facility: CLINIC | Age: 73
End: 2018-12-01

## 2018-12-12 NOTE — PROGRESS NOTES
11/26/18 faxed this preop and EKG results with lab results from 11/19/18 to DEANN @ [phone]    Juliano

## 2019-02-21 DIAGNOSIS — I25.10 CORONARY ARTERY DISEASE INVOLVING NATIVE CORONARY ARTERY OF NATIVE HEART WITHOUT ANGINA PECTORIS: ICD-10-CM

## 2019-02-22 RX ORDER — ATORVASTATIN CALCIUM 80 MG/1
TABLET, FILM COATED ORAL
Qty: 90 TABLET | Refills: 3 | Status: SHIPPED | OUTPATIENT
Start: 2019-02-22 | End: 2020-02-09

## 2019-10-02 ENCOUNTER — HEALTH MAINTENANCE LETTER (OUTPATIENT)
Age: 74
End: 2019-10-02

## 2019-12-15 ENCOUNTER — HEALTH MAINTENANCE LETTER (OUTPATIENT)
Age: 74
End: 2019-12-15

## 2021-01-15 ENCOUNTER — HEALTH MAINTENANCE LETTER (OUTPATIENT)
Age: 76
End: 2021-01-15

## 2021-02-02 DIAGNOSIS — Z11.59 ENCOUNTER FOR SCREENING FOR OTHER VIRAL DISEASES: Primary | ICD-10-CM

## 2021-02-14 DIAGNOSIS — Z11.59 ENCOUNTER FOR SCREENING FOR OTHER VIRAL DISEASES: ICD-10-CM

## 2021-02-14 LAB
SARS-COV-2 RNA RESP QL NAA+PROBE: NORMAL
SPECIMEN SOURCE: NORMAL

## 2021-02-14 PROCEDURE — U0003 INFECTIOUS AGENT DETECTION BY NUCLEIC ACID (DNA OR RNA); SEVERE ACUTE RESPIRATORY SYNDROME CORONAVIRUS 2 (SARS-COV-2) (CORONAVIRUS DISEASE [COVID-19]), AMPLIFIED PROBE TECHNIQUE, MAKING USE OF HIGH THROUGHPUT TECHNOLOGIES AS DESCRIBED BY CMS-2020-01-R: HCPCS | Performed by: ORTHOPAEDIC SURGERY

## 2021-02-14 PROCEDURE — U0005 INFEC AGEN DETEC AMPLI PROBE: HCPCS | Performed by: ORTHOPAEDIC SURGERY

## 2021-02-15 LAB
LABORATORY COMMENT REPORT: NORMAL
SARS-COV-2 RNA RESP QL NAA+PROBE: NEGATIVE
SPECIMEN SOURCE: NORMAL

## 2021-02-16 RX ORDER — POLYETHYLENE GLYCOL 400 2.5 MG/ML
SOLUTION/ DROPS OPHTHALMIC DAILY
COMMUNITY

## 2021-02-16 RX ORDER — ATORVASTATIN CALCIUM 80 MG/1
80 TABLET, FILM COATED ORAL DAILY
COMMUNITY

## 2021-02-17 ENCOUNTER — ANESTHESIA (OUTPATIENT)
Dept: SURGERY | Facility: CLINIC | Age: 76
End: 2021-02-17
Payer: MEDICARE

## 2021-02-17 ENCOUNTER — HOSPITAL ENCOUNTER (OUTPATIENT)
Facility: CLINIC | Age: 76
Discharge: HOME | End: 2021-02-17
Attending: ORTHOPAEDIC SURGERY | Admitting: ORTHOPAEDIC SURGERY
Payer: MEDICARE

## 2021-02-17 ENCOUNTER — ANESTHESIA EVENT (OUTPATIENT)
Dept: SURGERY | Facility: CLINIC | Age: 76
End: 2021-02-17
Payer: MEDICARE

## 2021-02-17 VITALS
OXYGEN SATURATION: 90 % | RESPIRATION RATE: 10 BRPM | HEIGHT: 70 IN | WEIGHT: 203 LBS | HEART RATE: 72 BPM | TEMPERATURE: 96.5 F | SYSTOLIC BLOOD PRESSURE: 133 MMHG | BODY MASS INDEX: 29.06 KG/M2 | DIASTOLIC BLOOD PRESSURE: 73 MMHG

## 2021-02-17 DIAGNOSIS — M75.102 ROTATOR CUFF TEAR, NON-TRAUMATIC, LEFT: Primary | ICD-10-CM

## 2021-02-17 LAB — GLUCOSE BLDC GLUCOMTR-MCNC: 122 MG/DL (ref 70–99)

## 2021-02-17 PROCEDURE — C1713 ANCHOR/SCREW BN/BN,TIS/BN: HCPCS | Performed by: ORTHOPAEDIC SURGERY

## 2021-02-17 PROCEDURE — 250N000009 HC RX 250: Performed by: ORTHOPAEDIC SURGERY

## 2021-02-17 PROCEDURE — 250N000011 HC RX IP 250 OP 636: Performed by: ORTHOPAEDIC SURGERY

## 2021-02-17 PROCEDURE — 710N000012 HC RECOVERY PHASE 2, PER MINUTE: Performed by: ORTHOPAEDIC SURGERY

## 2021-02-17 PROCEDURE — 250N000009 HC RX 250: Performed by: ANESTHESIOLOGY

## 2021-02-17 PROCEDURE — 250N000013 HC RX MED GY IP 250 OP 250 PS 637: Performed by: ANESTHESIOLOGY

## 2021-02-17 PROCEDURE — 258N000003 HC RX IP 258 OP 636: Performed by: NURSE ANESTHETIST, CERTIFIED REGISTERED

## 2021-02-17 PROCEDURE — 272N000001 HC OR GENERAL SUPPLY STERILE: Performed by: ORTHOPAEDIC SURGERY

## 2021-02-17 PROCEDURE — 250N000009 HC RX 250: Performed by: NURSE ANESTHETIST, CERTIFIED REGISTERED

## 2021-02-17 PROCEDURE — 370N000017 HC ANESTHESIA TECHNICAL FEE, PER MIN: Performed by: ORTHOPAEDIC SURGERY

## 2021-02-17 PROCEDURE — 272N000002 HC OR SUPPLY OTHER OPNP: Performed by: ORTHOPAEDIC SURGERY

## 2021-02-17 PROCEDURE — 250N000011 HC RX IP 250 OP 636: Performed by: ANESTHESIOLOGY

## 2021-02-17 PROCEDURE — 999N001017 HC STATISTIC GLUCOSE BY METER IP

## 2021-02-17 PROCEDURE — 250N000011 HC RX IP 250 OP 636: Performed by: NURSE ANESTHETIST, CERTIFIED REGISTERED

## 2021-02-17 PROCEDURE — 250N000025 HC SEVOFLURANE, PER MIN: Performed by: ORTHOPAEDIC SURGERY

## 2021-02-17 PROCEDURE — 710N000009 HC RECOVERY PHASE 1, LEVEL 1, PER MIN: Performed by: ORTHOPAEDIC SURGERY

## 2021-02-17 PROCEDURE — 999N000141 HC STATISTIC PRE-PROCEDURE NURSING ASSESSMENT: Performed by: ORTHOPAEDIC SURGERY

## 2021-02-17 PROCEDURE — 360N000076 HC SURGERY LEVEL 3, PER MIN: Performed by: ORTHOPAEDIC SURGERY

## 2021-02-17 PROCEDURE — 82947 ASSAY GLUCOSE BLOOD QUANT: CPT | Performed by: ANESTHESIOLOGY

## 2021-02-17 PROCEDURE — 250N000011 HC RX IP 250 OP 636: Performed by: PHYSICIAN ASSISTANT

## 2021-02-17 DEVICE — IMP ANCHOR TWINFIX S&N ULTRA 4.5MM W/2 #2 SU 72203103: Type: IMPLANTABLE DEVICE | Site: SHOULDER | Status: FUNCTIONAL

## 2021-02-17 DEVICE — IMP ANCHOR FOOTPRINT S&N ULTRA PK 4.5MM 72202901: Type: IMPLANTABLE DEVICE | Site: SHOULDER | Status: FUNCTIONAL

## 2021-02-17 RX ORDER — OXYCODONE HYDROCHLORIDE 5 MG/1
5-10 TABLET ORAL
Qty: 40 TABLET | Refills: 0 | Status: SHIPPED | OUTPATIENT
Start: 2021-02-17

## 2021-02-17 RX ORDER — DEXAMETHASONE SODIUM PHOSPHATE 4 MG/ML
INJECTION, SOLUTION INTRA-ARTICULAR; INTRALESIONAL; INTRAMUSCULAR; INTRAVENOUS; SOFT TISSUE PRN
Status: DISCONTINUED | OUTPATIENT
Start: 2021-02-17 | End: 2021-02-17

## 2021-02-17 RX ORDER — CEFAZOLIN SODIUM 2 G/100ML
2 INJECTION, SOLUTION INTRAVENOUS
Status: COMPLETED | OUTPATIENT
Start: 2021-02-17 | End: 2021-02-17

## 2021-02-17 RX ORDER — FENTANYL CITRATE 50 UG/ML
50 INJECTION, SOLUTION INTRAMUSCULAR; INTRAVENOUS ONCE
Status: DISCONTINUED | OUTPATIENT
Start: 2021-02-17 | End: 2021-02-17 | Stop reason: HOSPADM

## 2021-02-17 RX ORDER — ACETAMINOPHEN 325 MG/1
975 TABLET ORAL ONCE
Status: COMPLETED | OUTPATIENT
Start: 2021-02-17 | End: 2021-02-17

## 2021-02-17 RX ORDER — NALOXONE HYDROCHLORIDE 0.4 MG/ML
0.4 INJECTION, SOLUTION INTRAMUSCULAR; INTRAVENOUS; SUBCUTANEOUS
Status: DISCONTINUED | OUTPATIENT
Start: 2021-02-17 | End: 2021-02-17 | Stop reason: HOSPADM

## 2021-02-17 RX ORDER — FENTANYL CITRATE 50 UG/ML
INJECTION, SOLUTION INTRAMUSCULAR; INTRAVENOUS PRN
Status: DISCONTINUED | OUTPATIENT
Start: 2021-02-17 | End: 2021-02-17

## 2021-02-17 RX ORDER — CEFAZOLIN SODIUM 1 G/3ML
1 INJECTION, POWDER, FOR SOLUTION INTRAMUSCULAR; INTRAVENOUS SEE ADMIN INSTRUCTIONS
Status: DISCONTINUED | OUTPATIENT
Start: 2021-02-17 | End: 2021-02-17 | Stop reason: HOSPADM

## 2021-02-17 RX ORDER — SENNOSIDES A AND B 8.6 MG/1
1 TABLET, FILM COATED ORAL 2 TIMES DAILY PRN
Qty: 25 TABLET | Refills: 0 | Status: SHIPPED | OUTPATIENT
Start: 2021-02-17

## 2021-02-17 RX ORDER — CEFAZOLIN SODIUM 1 G/3ML
1 INJECTION, POWDER, FOR SOLUTION INTRAMUSCULAR; INTRAVENOUS ONCE
Status: COMPLETED | OUTPATIENT
Start: 2021-02-17 | End: 2021-02-17

## 2021-02-17 RX ORDER — PROPOFOL 10 MG/ML
INJECTION, EMULSION INTRAVENOUS PRN
Status: DISCONTINUED | OUTPATIENT
Start: 2021-02-17 | End: 2021-02-17

## 2021-02-17 RX ORDER — GLYCOPYRROLATE 0.2 MG/ML
INJECTION, SOLUTION INTRAMUSCULAR; INTRAVENOUS PRN
Status: DISCONTINUED | OUTPATIENT
Start: 2021-02-17 | End: 2021-02-17

## 2021-02-17 RX ORDER — SODIUM CHLORIDE, SODIUM LACTATE, POTASSIUM CHLORIDE, CALCIUM CHLORIDE 600; 310; 30; 20 MG/100ML; MG/100ML; MG/100ML; MG/100ML
INJECTION, SOLUTION INTRAVENOUS CONTINUOUS PRN
Status: DISCONTINUED | OUTPATIENT
Start: 2021-02-17 | End: 2021-02-17

## 2021-02-17 RX ORDER — FENTANYL CITRATE 50 UG/ML
25-50 INJECTION, SOLUTION INTRAMUSCULAR; INTRAVENOUS
Status: DISCONTINUED | OUTPATIENT
Start: 2021-02-17 | End: 2021-02-17 | Stop reason: HOSPADM

## 2021-02-17 RX ORDER — HYDROMORPHONE HYDROCHLORIDE 1 MG/ML
.3-.5 INJECTION, SOLUTION INTRAMUSCULAR; INTRAVENOUS; SUBCUTANEOUS EVERY 10 MIN PRN
Status: DISCONTINUED | OUTPATIENT
Start: 2021-02-17 | End: 2021-02-17 | Stop reason: HOSPADM

## 2021-02-17 RX ORDER — NALOXONE HYDROCHLORIDE 0.4 MG/ML
0.2 INJECTION, SOLUTION INTRAMUSCULAR; INTRAVENOUS; SUBCUTANEOUS
Status: DISCONTINUED | OUTPATIENT
Start: 2021-02-17 | End: 2021-02-17 | Stop reason: HOSPADM

## 2021-02-17 RX ORDER — ONDANSETRON 2 MG/ML
4 INJECTION INTRAMUSCULAR; INTRAVENOUS EVERY 30 MIN PRN
Status: DISCONTINUED | OUTPATIENT
Start: 2021-02-17 | End: 2021-02-17 | Stop reason: HOSPADM

## 2021-02-17 RX ORDER — LIDOCAINE HYDROCHLORIDE 20 MG/ML
INJECTION, SOLUTION INFILTRATION; PERINEURAL PRN
Status: DISCONTINUED | OUTPATIENT
Start: 2021-02-17 | End: 2021-02-17

## 2021-02-17 RX ORDER — CEFAZOLIN SODIUM 1 G
1 VIAL (EA) INJECTION ONCE
Qty: 1 G | Refills: 0 | Status: SHIPPED | OUTPATIENT
Start: 2021-02-17 | End: 2021-02-17

## 2021-02-17 RX ORDER — ONDANSETRON 2 MG/ML
INJECTION INTRAMUSCULAR; INTRAVENOUS PRN
Status: DISCONTINUED | OUTPATIENT
Start: 2021-02-17 | End: 2021-02-17

## 2021-02-17 RX ORDER — EPHEDRINE SULFATE 50 MG/ML
INJECTION, SOLUTION INTRAMUSCULAR; INTRAVENOUS; SUBCUTANEOUS PRN
Status: DISCONTINUED | OUTPATIENT
Start: 2021-02-17 | End: 2021-02-17

## 2021-02-17 RX ORDER — SODIUM CHLORIDE, SODIUM LACTATE, POTASSIUM CHLORIDE, CALCIUM CHLORIDE 600; 310; 30; 20 MG/100ML; MG/100ML; MG/100ML; MG/100ML
INJECTION, SOLUTION INTRAVENOUS CONTINUOUS
Status: DISCONTINUED | OUTPATIENT
Start: 2021-02-17 | End: 2021-02-17 | Stop reason: HOSPADM

## 2021-02-17 RX ORDER — ONDANSETRON 4 MG/1
4 TABLET, ORALLY DISINTEGRATING ORAL EVERY 30 MIN PRN
Status: DISCONTINUED | OUTPATIENT
Start: 2021-02-17 | End: 2021-02-17 | Stop reason: HOSPADM

## 2021-02-17 RX ORDER — HYDROXYZINE HYDROCHLORIDE 25 MG/1
25-50 TABLET, FILM COATED ORAL EVERY 6 HOURS PRN
Qty: 40 TABLET | Refills: 0 | Status: SHIPPED | OUTPATIENT
Start: 2021-02-17

## 2021-02-17 RX ORDER — CEPHALEXIN 500 MG/1
500 CAPSULE ORAL 4 TIMES DAILY
Qty: 12 CAPSULE | Refills: 0 | Status: SHIPPED | OUTPATIENT
Start: 2021-02-17 | End: 2021-02-20

## 2021-02-17 RX ADMIN — ACETAMINOPHEN 975 MG: 325 TABLET, FILM COATED ORAL at 17:38

## 2021-02-17 RX ADMIN — DEXMEDETOMIDINE HYDROCHLORIDE 8 MCG: 100 INJECTION, SOLUTION INTRAVENOUS at 14:30

## 2021-02-17 RX ADMIN — PROPOFOL 140 MG: 10 INJECTION, EMULSION INTRAVENOUS at 13:22

## 2021-02-17 RX ADMIN — CEFAZOLIN SODIUM 2 G: 2 INJECTION, SOLUTION INTRAVENOUS at 13:37

## 2021-02-17 RX ADMIN — CEFAZOLIN 1 G: 1 INJECTION, POWDER, FOR SOLUTION INTRAMUSCULAR; INTRAVENOUS at 16:33

## 2021-02-17 RX ADMIN — SODIUM CHLORIDE, POTASSIUM CHLORIDE, SODIUM LACTATE AND CALCIUM CHLORIDE: 600; 310; 30; 20 INJECTION, SOLUTION INTRAVENOUS at 11:23

## 2021-02-17 RX ADMIN — CEFAZOLIN 1 G: 1 INJECTION, POWDER, FOR SOLUTION INTRAMUSCULAR; INTRAVENOUS at 15:36

## 2021-02-17 RX ADMIN — PHENYLEPHRINE HYDROCHLORIDE 150 MCG: 10 INJECTION INTRAVENOUS at 13:34

## 2021-02-17 RX ADMIN — PROPOFOL 30 MG: 10 INJECTION, EMULSION INTRAVENOUS at 14:27

## 2021-02-17 RX ADMIN — Medication 5 MG: at 14:52

## 2021-02-17 RX ADMIN — FENTANYL CITRATE 50 MCG: 50 INJECTION, SOLUTION INTRAMUSCULAR; INTRAVENOUS at 13:22

## 2021-02-17 RX ADMIN — ONDANSETRON 4 MG: 2 INJECTION INTRAMUSCULAR; INTRAVENOUS at 15:55

## 2021-02-17 RX ADMIN — PHENYLEPHRINE HYDROCHLORIDE 100 MCG: 10 INJECTION INTRAVENOUS at 14:48

## 2021-02-17 RX ADMIN — PROPOFOL 60 MG: 10 INJECTION, EMULSION INTRAVENOUS at 13:24

## 2021-02-17 RX ADMIN — DEXMEDETOMIDINE HYDROCHLORIDE 4 MCG: 100 INJECTION, SOLUTION INTRAVENOUS at 15:01

## 2021-02-17 RX ADMIN — DEXAMETHASONE SODIUM PHOSPHATE 4 MG: 4 INJECTION, SOLUTION INTRA-ARTICULAR; INTRALESIONAL; INTRAMUSCULAR; INTRAVENOUS; SOFT TISSUE at 13:48

## 2021-02-17 RX ADMIN — PHENYLEPHRINE HYDROCHLORIDE 0.4 MCG/KG/MIN: 10 INJECTION INTRAVENOUS at 13:34

## 2021-02-17 RX ADMIN — FENTANYL CITRATE 50 MCG: 0.05 INJECTION, SOLUTION INTRAMUSCULAR; INTRAVENOUS at 16:59

## 2021-02-17 RX ADMIN — GLYCOPYRROLATE 0.2 MG: 0.2 INJECTION, SOLUTION INTRAMUSCULAR; INTRAVENOUS at 13:48

## 2021-02-17 RX ADMIN — PROPOFOL 20 MG: 10 INJECTION, EMULSION INTRAVENOUS at 15:03

## 2021-02-17 RX ADMIN — FENTANYL CITRATE 50 MCG: 50 INJECTION, SOLUTION INTRAMUSCULAR; INTRAVENOUS at 15:38

## 2021-02-17 RX ADMIN — MIDAZOLAM HYDROCHLORIDE 2 MG: 1 INJECTION, SOLUTION INTRAMUSCULAR; INTRAVENOUS at 12:47

## 2021-02-17 RX ADMIN — BUPIVACAINE HYDROCHLORIDE 20 ML: 5 INJECTION, SOLUTION EPIDURAL; INTRACAUDAL; PERINEURAL at 12:42

## 2021-02-17 RX ADMIN — PHENYLEPHRINE HYDROCHLORIDE 150 MCG: 10 INJECTION INTRAVENOUS at 13:24

## 2021-02-17 RX ADMIN — PHENYLEPHRINE HYDROCHLORIDE 100 MCG: 10 INJECTION INTRAVENOUS at 14:53

## 2021-02-17 RX ADMIN — FENTANYL CITRATE 50 MCG: 50 INJECTION, SOLUTION INTRAMUSCULAR; INTRAVENOUS at 14:18

## 2021-02-17 RX ADMIN — PHENYLEPHRINE HYDROCHLORIDE 100 MCG: 10 INJECTION INTRAVENOUS at 14:52

## 2021-02-17 RX ADMIN — LIDOCAINE HYDROCHLORIDE 60 MG: 20 INJECTION, SOLUTION INFILTRATION; PERINEURAL at 13:22

## 2021-02-17 RX ADMIN — Medication 1 EACH: at 13:18

## 2021-02-17 ASSESSMENT — LIFESTYLE VARIABLES: TOBACCO_USE: 1

## 2021-02-17 ASSESSMENT — MIFFLIN-ST. JEOR: SCORE: 1644.11

## 2021-02-17 ASSESSMENT — ENCOUNTER SYMPTOMS
DYSRHYTHMIAS: 0
SEIZURES: 0

## 2021-02-17 NOTE — BRIEF OP NOTE
[facility]    Brief Operative Note    Pre-operative diagnosis: LEFT ROTATOR CUFF TEAR, ACROMIOCLAVICULAR DJD, LHB TENDINOSIS  Post-operative diagnosis SAME, WITH CHRONIC LHB RUPTURE    Procedure:   1)  LEFT ARTHROSCOPIC ROTATOR CUFF REPAIR  2)  LEFT ARTHROSCOPIC DISTAL CLAVICLE RESECTION    Surgeon: Surgeon(s) and Role:     * Antelmo Frias MD - Primary   Assistant:  ELIDA LANTIGUA PA-C  Anesthesia: Combined General with Block   Estimated blood loss: Minimal  Drains: None  Specimens: * No specimens in log *  Findings:   None.  Complications: None.  Implants: SUTURE ANCHORS

## 2021-02-17 NOTE — ANESTHESIA CARE TRANSFER NOTE
Patient: Lui Up    Procedure(s):  LEFT SHOULDER ARTHROSCOPY ARTHROSCOPIC SUBCROMIAL DECOMPRESSION, DISTAL CLAVICLE RESECTION, ROTATOR CUFF REPAIR, EVALUATE BICEPS    Diagnosis: Complete tear of left rotator cuff, unspecified whether traumatic [M75.122]  Spontaneous rupture of flexor tendon of left upper arm [M66.322]  Arthritis of left shoulder region [M19.012]  Impingement syndrome of left shoulder [M75.42]  Diagnosis Additional Information: No value filed.    Anesthesia Type:   General     Note:    Oropharynx: oropharynx clear of all foreign objects and spontaneously breathing  Level of Consciousness: drowsy  Oxygen Supplementation: nasal cannula  Level of Supplemental Oxygen (L/min / FiO2): 3  Independent Airway: airway patency satisfactory and stable  Dentition: dentition unchanged  Vital Signs Stable: post-procedure vital signs reviewed and stable  Report to RN Given: handoff report given  Patient transferred to: Phase II    Handoff Report: Identifed the Patient, Identified the Reponsible Provider, Reviewed the pertinent medical history, Discussed the surgical course, Reviewed Intra-OP anesthesia mangement and issues during anesthesia, Set expectations for post-procedure period and Allowed opportunity for questions and acknowledgement of understanding      Vitals: (Last set prior to Anesthesia Care Transfer)  CRNA VITALS  2/17/2021 1534 - 2/17/2021 1609      2/17/2021             Pulse:  85    SpO2:  100 %    Resp Rate (set):  10        Electronically Signed By: BERNIE Hernadez CRNA  February 17, 2021  4:09 PM

## 2021-02-17 NOTE — DISCHARGE INSTRUCTIONS
"    [facility]      DISCHARGE INSTRUCTIONS FOLLOWING   REGIONAL BLOCK ANESTHESIA      Numbness or lack of feeling in the arm/leg that was operated may last up to 24 hours.  The average time is usually 10-15 hours.  You may not be able to lift or move the arm or leg where the operation was by itself during that time.  Long-acting local anesthetic medicines were used to give you long-lasting pain relief.    Wear a sling until your arm is completely \"awake\"    Avoid bumping your arm, leg or foot while it is numb    Avoid extremes of hot or cold while it is numb    Remain quiet and restful the day of surgery.  Resume normal activities gradually over the next day or so as advise by your surgeon.    Do not drive or operate  Any machinery until your extremity is full  \"awake\"        You will have a tingling and prickly sensation in your arm/leg as the feeling begins to return; you can also expect some discomfort. The amount of discomfort is unpredictable, but if you have more pain that can be controlled with the pain medication you received, you should contact your surgeon.  Start to take your pain pills as soon as you start to feel any discomfort or pain.         Home Care Following Outpatient Shoulder Surgery  MD Taqueria Christie PA-C  [phone]    After your surgery you will have limited use of your affected arm.  Please follow these guidelines to prevent any complications following you surgery.     Diet:  Your diet does not have any restrictions. You should drink plenty of fluids.    Pain Control:    You will have tingling and prickly sensation in your arm as your feeling begins to return.  Make sure you begin taking pain medication(s) before your arm fully awakens.  Taking the prescribed medication before the shoulder becomes painful is very helpful in minimizing any discomfort you might experience.  If your pain is not adequately controlled with the prescription you have have " received, contact our office.  Do not take any alcoholic beverages while taking prescription pain medications.     We typically provide you with a narcotic pain medication (Dialudid [hydromorphone] or oxycodone) and an antihistamine medication ( Vistaril [hydroxyzine]).  These two medications should be taken together on a regular schedule for the first 24-48 hours after surgery to maintain maximum pain control.  You can decrease the frequency of the medication as you initial pain begins to subside.      You have also been provided a non-steroidal anti-inflammatory medication (Ibuprofen or feldene). This is also helpful in reducing pain immediately after surgery.  If you have had a surgery that involved structural repair (for instance , rotator cuff repair or labrum repair), you should not take this medication beyond 24-36 hours after surgery. Take only the recommended doses, but do not continue beyond that point. Doing so could adversely affect the tissue healing, and therefore should be avoided.      You may also have been given an antibiotic prescription (typically clindamycin). This medication should be taken as instructed until the supply runs out.  If you experience any stomach upset, skin rash, or other adverse reaction, stop taking the medication and contact our office.    Activity:    Follow the physical regimen that has been prescribed for you. This will be explained to you by your physical therapist.  You should have already scheduled you therapy sessions in advance.  If you have not, contact Dr Frias's office at [phone].  They can recommend a physical therapist for you to work with, and/or assist in arranging the necessary appointments.  Please be sure to take the physical therapy referral with you to your first appointment.     If you have seen your physical therapist in advance of surgery, please begin the recommended exercise program according to the recommendation below.  Most often, this  involves performing codman (or pendulum) exercises.    Codman's (pendulum) exercises to begin:________________________________.    Refrain from driving until you check with your auto insurance company to see if you are covered for driving with one arm.  You should only resume driving when you are comfortable enough to avoid taking narcotic medication at or around driving time.    Assistive Devices:     You should wear sling or shoulder immobilizer for the following amount or time:___________________________________________________________.    Clothing:    Wearing a button up blouse or shirt is recommended.  The shoulder sling or immobilizer may be worn over the outside of your clothes.  When getting into the shirt or blouse, slider your affected arm into the sleeve first, leaving the arm at your side and sliding the sleeve up the arm.  Then, place the unaffected arm into the other sleeve.  When taking off the shirt or blouse, do the opposite: slide your unaffected arm out of the sleeve first, Then, slide the shirt down the surgical arm, leaving the arm at your side while doing so.      Incision Care/Showering Instructions:    Please follow the instructions below, according to the type of surgery you have undergone.  The appropriate box should be marked to indicate the instructions to follow.  If it is not, contact our office for specific instruction.    ___ All arthroscopic surgery     You may remove your dressing the third day after your surgery.  A small amount of drainage from the incisions is normal. Place band-aids over the incisions. Do not use any ointment or creams on the incision sites unless otherwise instructed to do so.      Please do not begin showering until the third day following surgery.  You should sponge bath until that time.      You may remove your sling for showering.  You should let your arm hand at your side during the shower; do not actively move the arm when out of the sling.  Do not  immerse the affected are under water.    When showering, leave band aids over the incisions.  When done showering, you should replace the band-aids with clean, dry ones.  Inspect your incisions at the time of dressing change for increase redness, swelling and drainage.  Notify our office if these develop.     ____Surgery involving open incisions    It is recommended to avoid showering until you return appointment.  There is no need to change the dressing.  The initial bandage is the most sterile, and it is safest to keep it in place.  If you notice significant drainage on the bandage, contact our office and we will provide appropriate instruction regarding changing the dressing.      At your return appointment, sutures will be removed.  Further instructions regarding management of the incision will be provided at that time.    Follow up    You have a follow up appointment scheduled to see Dr. Frias on      ______________________________________________________.    Any modifications to the above instructions will be made at that time.      If you have any questions about your surgery, please call Dr. Frias office at [phone].    You may also direct questions to Dr Frias care coordinator, Evie, at [phone].    Regional Anesthesia:    Regional anesthesia is injected by an anesthesiologist into or around appropriate nerves to numb the area having surgery.  It is a type of local anesthesia.    The anesthesia your physician used to numb your arm will wear off in 4 to 12 hours, but it may take even longer.  During that period, you should be careful because it is possible to injure the numbed arm and not be aware of the injury.  While your arm is numb, you should:        Wear a sling for support    Avoid bumping your arm    Avoid extreme hot or cold    Many patients will have a cold cuff provided for you.  It is safe for you to use this.  Recool this as necessary in the hours and days following your surgery.   Follow the instructions provided for you by nursing staff of the .  You may stop using it at your discretion, typically between 1 and 2 weeks following surgery.      Today you were given 975 mg of Tylenol at 5:35 pm. The recommended daily maximum dose is 4000 mg.         **If you have questions or concerns about your procedure,  call Dr. Frias at [phone]**

## 2021-02-17 NOTE — ANESTHESIA POSTPROCEDURE EVALUATION
Patient: Lui Up    Procedure(s):  LEFT SHOULDER ARTHROSCOPY ARTHROSCOPIC SUBCROMIAL DECOMPRESSION, DISTAL CLAVICLE RESECTION, ROTATOR CUFF REPAIR, EVALUATE BICEPS    Diagnosis:Complete tear of left rotator cuff, unspecified whether traumatic [M75.122]  Spontaneous rupture of flexor tendon of left upper arm [M66.322]  Arthritis of left shoulder region [M19.012]  Impingement syndrome of left shoulder [M75.42]  Diagnosis Additional Information: No value filed.    Anesthesia Type:  General    Note:  Disposition: Outpatient   Postop Pain Control: Uneventful            Sign Out: Well controlled pain   PONV: No   Neuro/Psych: Uneventful            Sign Out: Acceptable/Baseline neuro status   Airway/Respiratory: Uneventful            Sign Out: Acceptable/Baseline resp. status   CV/Hemodynamics: Uneventful            Sign Out: Acceptable CV status   Other NRE: NONE   DID A NON-ROUTINE EVENT OCCUR? No         Last vitals:  Vitals:    02/17/21 1645 02/17/21 1700 02/17/21 1715   BP: 133/71 131/71 137/77   Pulse: 73 72 73   Resp: (!) 7 21 9   Temp: 35.8  C (96.4  F)     SpO2: 96% 97% 96%       Last vitals prior to Anesthesia Care Transfer:  CRNA VITALS  2/17/2021 1534 - 2/17/2021 1634      2/17/2021             Pulse:  85    SpO2:  100 %    Resp Rate (set):  10          Electronically Signed By: Taqueria Silva DO  February 17, 2021  5:29 PM

## 2021-02-17 NOTE — ANESTHESIA PREPROCEDURE EVALUATION
Anesthesia Pre-Procedure Evaluation    Patient: Lui Up   MRN: [id number] : 1945        Preoperative Diagnosis: Complete tear of left rotator cuff, unspecified whether traumatic [M75.122]  Spontaneous rupture of flexor tendon of left upper arm [M66.322]  Arthritis of left shoulder region [M19.012]  Impingement syndrome of left shoulder [M75.42]   Procedure : Procedure(s):  LEFT SHOULDER ARTHROSCOPY ARTHROSCOPIC SUBCROMIAL DECOMPRESSION, DISTAL CLAVICLE RESECTION, ROTATOR CUFF REPAIR, EVALUATE BICEPS     Past Medical History:   Diagnosis Date     Aortic valve stenosis      Arthritis of knee      CAD (coronary artery disease)     based on calcium on ct     CKD (chronic kidney disease) stage 2, GFR 60-89 ml/min 2011     Colonic polyp      Diabetes (H)     TYPE 2     Diverticula of colon      GERD (gastroesophageal reflux disease)      Hearing loss on left      Heart murmur      Herpes simplex, ocular     history of herpes, prophylaxtic per ophtho     Hypercholesteremia      Hyperlipidemia LDL goal <70      Multiple lung nodules      Obese      Thrombocytopenia (H)       Past Surgical History:   Procedure Laterality Date     ARTHROSCOPY KNEE      left     COLONOSCOPY       EYE SURGERY       HEMICOLECTOMY, RT/LT  2009    right Laprocopic     HERNIORRHAPHY INCISIONAL (LOCATION)  10/2010     LAMINECTOMY LUMBAR ONE LEVEL  ,,    3 surgeriies      ORTHOPEDIC SURGERY Right 2020    SHOULDER ARTHROSCOPY WITH ROTATOR CUFF REPAIR     ORTHOPEDIC SURGERY Right 2020    SHOULDER ARTHROSCOPY WITH SUBACROMIAL DECOMPRESSIONAND DIDTAL CLAVICLE EXCISION     ORTHOPEDIC SURGERY Right 2019    TRIGGER FINGER RELEASE      No Known Allergies   Social History     Tobacco Use     Smoking status: Former Smoker     Types: Cigarettes     Quit date: 1971     Years since quittin.6     Smokeless tobacco: Never Used     Tobacco comment: quit in colleg   Substance Use Topics     Alcohol  use: Yes     Alcohol/week: 5.0 - 7.0 standard drinks     Types: 5 - 7 Cans of beer per week      Wt Readings from Last 1 Encounters:   02/17/21 92.1 kg (203 lb)        Prior to Admission medications    Medication Sig Start Date End Date Taking? Authorizing Provider   aspirin 81 MG tablet Take 1 tablet by mouth every morning.   Yes Reported, Patient   atorvastatin (LIPITOR) 80 MG tablet Take 80 mg by mouth daily   Yes Reported, Patient   empagliflozin (JARDIANCE) 10 MG TABS tablet Take 10 mg by mouth daily   Yes Reported, Patient   Multiple Vitamin (MULTI-VITAMINS) TABS Take 1 tablet by mouth daily    Yes Reported, Patient   Polyethylene Glycol 400 (BLINK TEARS) 0.25 % GEL Place into both eyes daily   Yes Reported, Patient      ECG: afib   ECHO 9-2020: Mild left ventricular hypertrophy.   Normal left atrial size.   Mild to moderate aortic stenosis with a mean gradient of 19 mm Hg.   Estimated pulmonary artery pressure of 23 mmHg + RA pressure.   Aortic root is mildly dilated measuring 4.4 cm at the level of the sinuses.      Anesthesia Evaluation            ROS/MED HX  ENT/Pulmonary:     (+) tobacco use, Past use,  (-) asthma and sleep apnea   Neurologic:     (+) no peripheral neuropathy  (-) no seizures, no CVA and migraines   Cardiovascular:     (+) --CAD ---valvular problems/murmurs  (-) hypertension, past MI, GARZA, arrhythmias, dyslipidemia and past MI   METS/Exercise Tolerance:     Hematologic:    (-) history of blood clots and anemia   Musculoskeletal:    (-) arthritis   GI/Hepatic:     (+) GERD,  (-) liver disease   Renal/Genitourinary:     (+) renal disease, type: CRI,     Endo:     (+) type II DM, Obesity,  (-) Type I DM and thyroid disease   Psychiatric/Substance Use:    (-) psychiatric history   Infectious Disease:    (-) Recent Fever   Malignancy:       Other:            Physical Exam    Airway        Mallampati: II   TM distance: < 3 FB   Neck ROM: full     Respiratory Devices and Support         Dental   no notable dental history         Cardiovascular   cardiovascular exam normal          Pulmonary   pulmonary exam normal                OUTSIDE LABS:  CBC:   Lab Results   Component Value Date    WBC 7.1 11/19/2018    WBC 8.2 08/22/2018    HGB 14.4 11/19/2018    HGB 14.6 08/22/2018    HCT 43.9 11/19/2018    HCT 44.0 08/22/2018     11/19/2018     08/22/2018     BMP:   Lab Results   Component Value Date     11/19/2018     08/22/2018    POTASSIUM 4.4 11/19/2018    POTASSIUM 4.9 08/22/2018    CHLORIDE 103 11/19/2018    CHLORIDE 104 08/22/2018    CO2 29 11/22/2010    CO2 34 (H) 11/20/2010    BUN 11 11/19/2018    BUN 10 11/19/2018    CR 1.06 11/19/2018    CR 1.21 08/22/2018     (H) 11/19/2018     (H) 08/22/2018     COAGS:   Lab Results   Component Value Date    INR 1.02 08/15/2007     POC:   Lab Results   Component Value Date     (H) 02/17/2021     HEPATIC:   Lab Results   Component Value Date    ALBUMIN 4.3 08/22/2018    PROTTOTAL 6.3 08/22/2018    ALT 17 08/22/2018    AST 16 08/22/2018    ALKPHOS 102 08/22/2018    BILITOTAL 1.1 08/22/2018    BILIDIRECT 0.28 08/22/2018     OTHER:   Lab Results   Component Value Date    ILYA 9.6 11/19/2018    MAG 2.0 11/22/2010    LIPASE 358 (H) 08/15/2007    AMYLASE 120 (H) 08/15/2007       Anesthesia Plan    ASA Status:  2   NPO Status:  NPO Appropriate    Anesthesia Type: General.     - Airway: LMA   Induction: Propofol.   Maintenance: Balanced.        Consents    Anesthesia Plan(s) and associated risks, benefits, and realistic alternatives discussed. Questions answered and patient/representative(s) expressed understanding.     - Discussed with:  Patient         Postoperative Care    Pain management: Peripheral nerve block (Single Shot), Multi-modal analgesia.   PONV prophylaxis: Ondansetron (or other 5HT-3), Dexamethasone or Solumedrol     Comments:    Background propofol infusion              Ros Horton MD

## 2021-02-17 NOTE — ANESTHESIA PROCEDURE NOTES
Pre-Procedure   Staff -   Anesthesiologist:  Ros Horton MD  Performed By: anesthesiologist  Location: pre-op  Pre-Anesthestic Checklist: patient identified, IV checked, site marked, risks and benefits discussed, informed consent, monitors and equipment checked, pre-op evaluation, at physician/surgeon's request and post-op pain management  Timeout:  Correct Patient: Yes   Correct Procedure: Yes   Correct Site: Yes   Correct Position: Yes   Correct Laterality: Yes   Site Marked: Yes    Procedure Documentation  Procedure: Interscalene (superior trunk approach)  Laterality: left  Patient Position:sitting  Patient Prep/Sterile Barriers: Betadine  Local skin infiltrated with 1 mL of 1% lidocaine.   Needle type: insulated  Needle Gauge: 21.   Needle Length (Inches) 3.13   Ultrasound guided, Ultrasound used to identify targeted nerve, plexus, vascular marker, or fascial plane and place a needle adjacent to it in real-time, Ultrasound was used to visualize the spread of anesthetic in close proximity to the above referenced structure  A permanent image is entered into the patient's record.  The nerve(s) appeared anatomically normal, There were no apparent abnormal pathologic findings    Assessment/Narrative      The placement was negative for: blood aspirated, painful injection and site bleeding  Paresthesias: No.  Bolus given via needle. No blood aspirated via catheter.   Secured via.   Insertion/Infusion Method: Single Shot  Comments:  Bolus via needle, 20 ml of 0.5% Bupivacaine with 1:400,000 epinephrine.  Patient tolerated well, was mildly sedated but communicative throughout the procedure.   The surgeon has given a verbal order transferring care of this patient to me for the performance of regional analgesia block for post op pain control. It is requested of me because I am uniquely trained and qualified to perform this block and the surgeon is neither trained nor qualified to perform this procedure.

## 2021-02-18 NOTE — OP NOTE
Procedure Date: 02/17/2021      PREOPERATIVE DIAGNOSES:   1.  Left rotator cuff tear.   2.  Left acromioclavicular degenerative joint disease.      POSTOPERATIVE DIAGNOSES:   1.  Left rotator cuff tear.   2.  Left acromioclavicular degenerative joint disease.   3.  Chronic long head biceps rupture.      PROCEDURES PERFORMED:   1.  Left arthroscopic rotator cuff repair.   2.  Left arthroscopic distal clavicle resection.      SURGEON:  Antelmo Frias MD      ASSISTANT:  Taqueria Silva PA-C      ANESTHESIA:  Interscalene regional, general laryngeal mask airway.      ESTIMATED BLOOD LOSS:  Negligible.      FLUID REPLACEMENT:  One liter of crystalloid.      COMPLICATIONS:  No immediate complications.      PREOPERATIVE INDICATIONS:  Please see preoperative clinical notes.      EXAMINATION OF LEFT SHOULDER UNDER ANESTHESIA FINDINGS:  Passive range of motion 160/80/60/80/40.      ARTHROSCOPIC FINDINGS:  Chondral surfaces were diffusely grade 1 at the humeral head and glenoid vault.  No loose bodies evident.  Degenerative circumferential age-appropriate labral fraying.  Chronic long head biceps rupture with absence of the intra-articular portion of the long head biceps tendon.  The articular and bursal aspects of the subscapularis, posterior infraspinatus, and teres minor were intact.  There was a complex large full-thickness supra/leading edge infraspinatus tear.  There was a roughly 25 x 35 mm central intrasubstance full-thickness lesion at the anterior aspect of the supraspinatus near the rotator interval, and then a small crescent-shaped full-thickness supraspinatus tear measuring 15 x 20 mm that was separate from the other lesion directly at the anterolateral insertion point.  The posterior rotator cuff was otherwise intact.  There was copious bursal inflammation, type 2, acromion, and hypertrophic change at the AC joint.      DESCRIPTION OF PROCEDURE:  The patient was identified in the preoperative holding area and  taken to room #35 of the main OR.  Prior to his arrival in the operating suite, he underwent a regional interscalene anesthetic in the left upper extremity in standard fashion.  He was given 2 g of Ancef IV.  After smooth IV induction, general anesthesia was introduced and his laryngeal mask airway secured.  He was then repositioned in modified beach chair position with the head and neck secured in neutral position and all peripheral extremities thoroughly padded with foam.  Left upper extremity was then widely prepped and draped in the usual sterile fashion.  Pneumoboots were in place and operational during the procedure.      Surgical team took timeout to confirm the correct patient, correct site marking, correct equipment and implants, correct images were available, and that he had been administered IV antibiotic therapy.      Anatomic landmarks were outlined.  Diagnostic left glenohumeral arthroscopy was then performed through a posterior viewing portal, established in a soft spot.  Anterior, anterolateral and posterolateral working portals established utilizing outside-in techniques.  Diagnostic findings were as mentioned.  Intra-articular synovectomy was performed with an electro-ablation device on low intermittent setting with constant outflow to avoid increased temperature.      The findings were as noted.  Attention was turned to the subacromial space.  A complete bursectomy was performed utilizing arm rotation to deliver all inflamed tissue, the shaver and electroablation device on low intermittent setting.  Coracoacromial ligament was recessed slightly and a very conservative acromioplasty performed, maintaining the integrity of the coracoacromial arch, given the large nature of the rotator cuff tear, tapering back to a smooth flat interface.      I noted the reduction and configuration of the tear.  This was amenable to a side-to-side suture closure for the large intrasubstance opening anteriorly.  This  was done with 3 side-to-side margin convergence #2 Ultrabraid sutures utilizing suture hooks.  Anatomic closure was noted.  These were then sequentially secured.  This left us with a small more typical anterolateral insertional rotator cuff tear.  The exposed greater tuberosity footprint was denuded of soft tissue and lightly decorticated to create some punctate bleeding at the repair site.  A single Smith [employer]hew 4.5 mm PEEK TwinFix anchors placed just off the articular margin.  Mattress sutures placed through the remaining leaflet of the tear.  These were then sequentially tied utilizing standard arthroscopic knot-tying techniques, obtaining a nice watertight closure.  Laterally, a single Smith [employer] Nephew 4.5 mm PEEK footprint anchor was utilized for transosseous-equivalent fixation.  The repair was probed, found to be very satisfactory, watertight, and the sutures were then trimmed.      I then visualized the subacromial space from lateral and took down the inferior AC joint capsule with the electroablation device on low intermittent setting.  I then resected roughly 2-3 mm of medial acromion and roughly 5-6 mm of the lateral clavicle for a 1 cm decompression while maintaining the posterior-superior joint capsule integrity.      A 70-degree arthroscope was inserted to confirm adequate visualization resection.  Subacromial and acromioclavicular spaces were then thoroughly lavaged.  Instruments were removed.  Sharps and sponge counts were correct.  Portal sites were closed simply with 3-0 nylon mattress suture.  Bulky sterile dressing was applied.  He was then placed into a Cryo/Cuff and UltraSling in neutral position, was then reversed, extubated and taken back to the recovery room in stable condition.  There were no immediate complications and he appeared to tolerate the procedure well.      A skilled first assistant was necessary for this procedure for assistance with patient positioning, prepping, draping,  surgical visualization, performance of the repair, wound closure, dressing and sling application.      PQRI MEASURES:   1.  The patient was given 2 g of Ancef IV within 1 hour prior to skin incision.  IV antibiotic therapy was discontinued within 24 hours postoperatively.   2.  Deep venous thrombosis prophylaxis with resumption of his baseline baby aspirin.   3.  UltraSling x 6 weeks for protection.   4.  Physical therapy will be for elbow and wrist range of motion only for the first week, followed by transition to pendulums, elbow and wrist range of motion on postoperative day #7 through 14, followed by transition to standard phase 1 arthroscopic large rotator cuff repair protocol beginning postoperative day #15.   5.  He should have a Zanca, an outlet radiograph of the left shoulder, and return to office in 1 week for suture removal.         ANTELMO FRIAS MD             D: 2021   T: 2021   MT: JOSE      Name:     JULIETH JACKSON   MRN:      [id number]        Account:        [id number]   :      1945           Procedure Date: 2021      Document: [id number]       cc: Antelmo Frias MD

## 2021-09-04 ENCOUNTER — HEALTH MAINTENANCE LETTER (OUTPATIENT)
Age: 76
End: 2021-09-04

## 2022-02-19 ENCOUNTER — HEALTH MAINTENANCE LETTER (OUTPATIENT)
Age: 77
End: 2022-02-19

## 2022-10-22 ENCOUNTER — HEALTH MAINTENANCE LETTER (OUTPATIENT)
Age: 77
End: 2022-10-22

## 2023-04-01 ENCOUNTER — HEALTH MAINTENANCE LETTER (OUTPATIENT)
Age: 78
End: 2023-04-01

## (undated) DEVICE — DEVICE PASSER LASSO 25DEG CVD RT AR-6068-25TR

## (undated) DEVICE — ABLATOR ARTHREX APOLLO RF MP90 ASPIRATING 90DEG AR-9811

## (undated) DEVICE — SYR 05ML SLIP TIP W/O NDL 309647

## (undated) DEVICE — ARTHROSCOPIC CANNULA 5.5X70MM GRAY  9718

## (undated) DEVICE — MANIFOLD NEPTUNE 4 PORT 700-20

## (undated) DEVICE — GLOVE PROTEXIS BLUE W/NEU-THERA 8.5  2D73EB85

## (undated) DEVICE — SU ETHILON 3-0 FS-1 18" 669H

## (undated) DEVICE — BUR ARTHREX COOLCUT OVAL 5.5MMX13CM AR-8550FOE

## (undated) DEVICE — PACK SHOULDER ARTHROSCOPY SOP15SAFSH

## (undated) DEVICE — SOL WATER IRRIG 1000ML BOTTLE 2F7114

## (undated) DEVICE — CONNECTOR STOPCOCK 3 WAY MALE LL 2C6204

## (undated) DEVICE — DRAPE ARTHROSCOPY SHOULDER BEACHCHAIR 29369

## (undated) DEVICE — DRAPE IOBAN INCISE 23X17" 6650EZ

## (undated) DEVICE — NDL 19GA 1.5"

## (undated) DEVICE — CONNECTOR STOPCOCK 3 WAY MALE LL MX5311L

## (undated) DEVICE — SU PDS II 2-0 CT-2 27"  Z333H

## (undated) DEVICE — SOL NACL 0.9% IRRIG 1000ML BOTTLE 2F7124

## (undated) DEVICE — SOL NACL 0.9% IRRIG 3000ML BAG 2B7477

## (undated) DEVICE — GLOVE PROTEXIS W/NEU-THERA 8.5  2D73TE85

## (undated) DEVICE — TUBING ARTHROSCOPY PUMP ARTHREX AR-6410

## (undated) DEVICE — DEVICE PASSER LASSO 25DEG CVD LT AR-6068-25TL

## (undated) DEVICE — SUTURE ANCHOR ULTRABRAID BLUE 2MMX38" 72202965

## (undated) DEVICE — PREP CHLORAPREP 26ML TINTED ORANGE  260815

## (undated) DEVICE — Device

## (undated) DEVICE — DRAPE MAYO STAND 23X54 8337

## (undated) DEVICE — LINEN TOWEL PACK X5 5464

## (undated) DEVICE — KIT SHOULDER POSITIONING BEACH CHAIR ARM HOLDER AR-1644

## (undated) DEVICE — DRAPE STERI TOWEL LG 1010

## (undated) RX ORDER — DEXAMETHASONE SODIUM PHOSPHATE 4 MG/ML
INJECTION, SOLUTION INTRA-ARTICULAR; INTRALESIONAL; INTRAMUSCULAR; INTRAVENOUS; SOFT TISSUE
Status: DISPENSED
Start: 2021-02-17

## (undated) RX ORDER — ONDANSETRON 2 MG/ML
INJECTION INTRAMUSCULAR; INTRAVENOUS
Status: DISPENSED
Start: 2021-02-17

## (undated) RX ORDER — FENTANYL CITRATE 0.05 MG/ML
INJECTION, SOLUTION INTRAMUSCULAR; INTRAVENOUS
Status: DISPENSED
Start: 2021-02-17

## (undated) RX ORDER — LIDOCAINE HYDROCHLORIDE 20 MG/ML
INJECTION, SOLUTION EPIDURAL; INFILTRATION; INTRACAUDAL; PERINEURAL
Status: DISPENSED
Start: 2021-02-17

## (undated) RX ORDER — GLYCOPYRROLATE 0.2 MG/ML
INJECTION, SOLUTION INTRAMUSCULAR; INTRAVENOUS
Status: DISPENSED
Start: 2021-02-17

## (undated) RX ORDER — CEFAZOLIN SODIUM 1 G/3ML
INJECTION, POWDER, FOR SOLUTION INTRAMUSCULAR; INTRAVENOUS
Status: DISPENSED
Start: 2021-02-17

## (undated) RX ORDER — FENTANYL CITRATE 50 UG/ML
INJECTION, SOLUTION INTRAMUSCULAR; INTRAVENOUS
Status: DISPENSED
Start: 2021-02-17

## (undated) RX ORDER — CEFAZOLIN SODIUM 2 G/100ML
INJECTION, SOLUTION INTRAVENOUS
Status: DISPENSED
Start: 2021-02-17

## (undated) RX ORDER — BUPIVACAINE HYDROCHLORIDE AND EPINEPHRINE 5; 5 MG/ML; UG/ML
INJECTION, SOLUTION EPIDURAL; INTRACAUDAL; PERINEURAL
Status: DISPENSED
Start: 2021-02-17

## (undated) RX ORDER — ACETAMINOPHEN 325 MG/1
TABLET ORAL
Status: DISPENSED
Start: 2021-02-17